# Patient Record
Sex: FEMALE | Race: BLACK OR AFRICAN AMERICAN | NOT HISPANIC OR LATINO | Employment: UNEMPLOYED | URBAN - METROPOLITAN AREA
[De-identification: names, ages, dates, MRNs, and addresses within clinical notes are randomized per-mention and may not be internally consistent; named-entity substitution may affect disease eponyms.]

---

## 2023-03-17 PROBLEM — E04.1 THYROID NODULE, COLD: Status: ACTIVE | Noted: 2023-03-17

## 2023-03-17 PROBLEM — R79.89 LOW TSH LEVEL: Status: ACTIVE | Noted: 2023-03-17

## 2023-03-17 PROBLEM — M54.2 CERVICALGIA: Status: ACTIVE | Noted: 2023-03-17

## 2023-03-17 PROBLEM — M54.12 CERVICAL RADICULOPATHY: Status: ACTIVE | Noted: 2023-03-17

## 2023-03-17 PROBLEM — S62.605A: Status: ACTIVE | Noted: 2023-03-17

## 2023-03-17 PROBLEM — N87.9 CERVICAL DYSPLASIA: Status: ACTIVE | Noted: 2023-03-17

## 2023-03-17 PROBLEM — N39.0 URINARY TRACT INFECTION: Status: ACTIVE | Noted: 2023-03-17

## 2023-03-17 PROBLEM — H60.91 OTITIS EXTERNA OF RIGHT EAR: Status: ACTIVE | Noted: 2023-03-17

## 2023-03-17 PROBLEM — H52.4 HYPEROPIA WITH PRESBYOPIA OF BOTH EYES: Status: ACTIVE | Noted: 2023-03-17

## 2023-03-17 PROBLEM — E55.9 VITAMIN D DEFICIENCY: Status: ACTIVE | Noted: 2023-03-17

## 2023-03-17 PROBLEM — E05.00 GRAVES' DISEASE: Status: ACTIVE | Noted: 2023-03-17

## 2023-03-17 PROBLEM — M79.672 LEFT FOOT PAIN: Status: ACTIVE | Noted: 2023-03-17

## 2023-03-17 PROBLEM — R09.82 POSTNASAL DRIP: Status: ACTIVE | Noted: 2023-03-17

## 2023-03-17 PROBLEM — L23.9 ALLERGIC DERMATITIS: Status: ACTIVE | Noted: 2023-03-17

## 2023-03-17 PROBLEM — R87.619 ABNORMAL PAP SMEAR OF CERVIX: Status: ACTIVE | Noted: 2023-03-17

## 2023-03-17 PROBLEM — E78.5 DYSLIPIDEMIA: Status: ACTIVE | Noted: 2023-03-17

## 2023-03-17 PROBLEM — H52.203 ASTIGMATISM OF BOTH EYES: Status: ACTIVE | Noted: 2023-03-17

## 2023-03-17 PROBLEM — R05.9 COUGH: Status: ACTIVE | Noted: 2023-03-17

## 2023-03-17 PROBLEM — H52.03 HYPEROPIA WITH PRESBYOPIA OF BOTH EYES: Status: ACTIVE | Noted: 2023-03-17

## 2023-03-17 PROBLEM — H25.13 CATARACT, NUCLEAR SCLEROTIC, BOTH EYES: Status: ACTIVE | Noted: 2023-03-17

## 2023-03-17 PROBLEM — E04.2 MULTINODULAR THYROID: Status: ACTIVE | Noted: 2023-03-17

## 2023-03-17 PROBLEM — J30.9 ALLERGIC RHINITIS: Status: ACTIVE | Noted: 2023-03-17

## 2023-03-17 PROBLEM — Q66.72 CAVUS DEFORMITY OF LEFT FOOT: Status: ACTIVE | Noted: 2023-03-17

## 2023-03-17 PROBLEM — K21.9 GERD (GASTROESOPHAGEAL REFLUX DISEASE): Status: ACTIVE | Noted: 2023-03-17

## 2023-03-17 PROBLEM — M19.079 1ST MTP ARTHRITIS: Status: ACTIVE | Noted: 2023-03-17

## 2023-03-17 PROBLEM — M20.12 HALLUX VALGUS WITH BUNIONS OF LEFT FOOT: Status: ACTIVE | Noted: 2023-03-17

## 2023-03-17 PROBLEM — S13.9XXA CERVICAL SPRAIN: Status: ACTIVE | Noted: 2023-03-17

## 2023-03-17 PROBLEM — S23.9XXA THORACIC SPRAIN: Status: ACTIVE | Noted: 2023-03-17

## 2023-03-17 PROBLEM — M21.612 HALLUX VALGUS WITH BUNIONS OF LEFT FOOT: Status: ACTIVE | Noted: 2023-03-17

## 2023-03-17 RX ORDER — ASPIRIN 325 MG
1 TABLET, DELAYED RELEASE (ENTERIC COATED) ORAL
COMMUNITY
Start: 2017-01-10

## 2023-03-17 RX ORDER — OMEPRAZOLE 20 MG/1
20 CAPSULE, DELAYED RELEASE ORAL DAILY
COMMUNITY
End: 2023-06-30

## 2023-03-17 RX ORDER — FLUTICASONE PROPIONATE 50 MCG
2 SPRAY, SUSPENSION (ML) NASAL DAILY
COMMUNITY
Start: 2015-03-30 | End: 2023-08-24

## 2023-03-17 RX ORDER — OFLOXACIN 3 MG/ML
10 SOLUTION AURICULAR (OTIC) 2 TIMES DAILY
COMMUNITY
End: 2023-03-22

## 2023-03-17 RX ORDER — AMMONIUM LACTATE 12 G/100G
1 LOTION TOPICAL 2 TIMES DAILY
COMMUNITY
Start: 2015-06-19

## 2023-03-22 ENCOUNTER — OFFICE VISIT (OUTPATIENT)
Dept: PRIMARY CARE | Facility: CLINIC | Age: 63
End: 2023-03-22
Payer: COMMERCIAL

## 2023-03-22 VITALS
DIASTOLIC BLOOD PRESSURE: 80 MMHG | HEART RATE: 75 BPM | SYSTOLIC BLOOD PRESSURE: 146 MMHG | HEIGHT: 63 IN | WEIGHT: 157 LBS | BODY MASS INDEX: 27.82 KG/M2

## 2023-03-22 DIAGNOSIS — J01.11 ACUTE RECURRENT FRONTAL SINUSITIS: Primary | ICD-10-CM

## 2023-03-22 PROCEDURE — 99214 OFFICE O/P EST MOD 30 MIN: CPT | Performed by: NURSE PRACTITIONER

## 2023-03-22 PROCEDURE — 1036F TOBACCO NON-USER: CPT | Performed by: NURSE PRACTITIONER

## 2023-03-22 RX ORDER — CIPROFLOXACIN HYDROCHLORIDE AND HYDROCORTISONE 2; 10 MG/ML; MG/ML
3 SUSPENSION AURICULAR (OTIC) 2 TIMES DAILY
Qty: 10 ML | Refills: 0 | Status: SHIPPED | OUTPATIENT
Start: 2023-03-22 | End: 2023-03-29

## 2023-03-22 RX ORDER — CEFUROXIME AXETIL 500 MG/1
500 TABLET ORAL 2 TIMES DAILY
Qty: 14 TABLET | Refills: 0 | Status: SHIPPED | OUTPATIENT
Start: 2023-03-22 | End: 2023-03-29

## 2023-03-22 NOTE — PROGRESS NOTES
"Subjective   Patient ID: Linda Rice is a 62 y.o. female who presents for Sinus Problem (Pt complains of a sinus infection ).    Linda is a patient of Dr. Breaux who presents to the office today with concerns of nasal congestion and sinus pressure for the past 2 weeks. She has had occasional cough. No fever, chills, body aches or diarrhea. Stated her ears feel congestion.         Review of Systems   All other systems reviewed and are negative.      Objective   /80   Pulse 75   Ht 1.6 m (5' 3\")   Wt 71.2 kg (157 lb)   BMI 27.81 kg/m²     Physical Exam  Constitutional:       Appearance: Normal appearance.   HENT:      Head: Normocephalic and atraumatic.      Right Ear: Tympanic membrane normal.      Ears:      Comments: Left TM with hazy cone of light.      Nose: Congestion (nasal mucosa with erythema and swelling.) present.      Mouth/Throat:      Mouth: Mucous membranes are moist.      Pharynx: Oropharynx is clear.   Eyes:      Extraocular Movements: Extraocular movements intact.      Conjunctiva/sclera: Conjunctivae normal.      Pupils: Pupils are equal, round, and reactive to light.   Cardiovascular:      Rate and Rhythm: Normal rate and regular rhythm.      Pulses: Normal pulses.      Heart sounds: Normal heart sounds.   Pulmonary:      Effort: Pulmonary effort is normal.      Breath sounds: Normal breath sounds.   Abdominal:      General: Abdomen is flat. Bowel sounds are normal.      Palpations: Abdomen is soft.   Musculoskeletal:         General: Normal range of motion.      Cervical back: Normal range of motion and neck supple.   Skin:     General: Skin is warm and dry.      Capillary Refill: Capillary refill takes less than 2 seconds.   Neurological:      General: No focal deficit present.      Mental Status: She is alert and oriented to person, place, and time. Mental status is at baseline.   Psychiatric:         Mood and Affect: Mood normal.         Behavior: Behavior normal.         " Thought Content: Thought content normal.         Judgment: Judgment normal.         Assessment/Plan   Problem List Items Addressed This Visit    None  Visit Diagnoses       Acute recurrent frontal sinusitis    -  Primary    Relevant Medications    cefuroxime (Ceftin) 500 mg tablet    ciprofloxacin-hydrocortisone (Cipro HC) otic suspension          Acute sinusitis: make sure you are staying well hydrated with fluids. Follow-up if no improvement of if symptoms worsen.

## 2023-05-11 ENCOUNTER — OFFICE VISIT (OUTPATIENT)
Dept: PRIMARY CARE | Facility: CLINIC | Age: 63
End: 2023-05-11
Payer: COMMERCIAL

## 2023-05-11 VITALS
DIASTOLIC BLOOD PRESSURE: 73 MMHG | SYSTOLIC BLOOD PRESSURE: 105 MMHG | BODY MASS INDEX: 27.29 KG/M2 | HEIGHT: 63 IN | WEIGHT: 154 LBS

## 2023-05-11 DIAGNOSIS — K21.9 GASTROESOPHAGEAL REFLUX DISEASE WITHOUT ESOPHAGITIS: ICD-10-CM

## 2023-05-11 DIAGNOSIS — J30.1 ALLERGIC RHINITIS DUE TO POLLEN, UNSPECIFIED SEASONALITY: ICD-10-CM

## 2023-05-11 DIAGNOSIS — J01.00 ACUTE NON-RECURRENT MAXILLARY SINUSITIS: Primary | ICD-10-CM

## 2023-05-11 PROCEDURE — 1036F TOBACCO NON-USER: CPT | Performed by: FAMILY MEDICINE

## 2023-05-11 PROCEDURE — 99214 OFFICE O/P EST MOD 30 MIN: CPT | Performed by: FAMILY MEDICINE

## 2023-05-11 RX ORDER — LEVOFLOXACIN 500 MG/1
500 TABLET, FILM COATED ORAL DAILY
Qty: 10 TABLET | Refills: 0 | Status: SHIPPED | OUTPATIENT
Start: 2023-05-11 | End: 2023-05-21

## 2023-05-13 PROBLEM — J01.00 ACUTE NON-RECURRENT MAXILLARY SINUSITIS: Status: ACTIVE | Noted: 2023-05-13

## 2023-05-13 NOTE — ASSESSMENT & PLAN NOTE
You have a sinus infection.  Take the antibiotic you were prescribed until it is gone.  Take Mucinex DM or Robitussin DM as needed for cough and congestion.  You can also try Flonase Allergy 2 sprays to each nostril once a day for congestion.  You can also try salt water nose spray for stuffy nose.  If you do not have high blood pressure or heart problems, you can try Sudafed or similar decongestants for congestion.  You can take Claritin or Zyrtec as needed for drainage.  Take over-the-counter acetaminophen or ibuprofen as needed for pain or fever.  You can try throat lozenges or salt water gargles for any sore throat.  Your symptoms may take 7-10 days to go away.  Drink lots of fluids. Get plenty of rest.  Contact us if you have worsening symptoms such as high fever (102F), shortness of breath, inability to keep liquids and solids down, or other worsening symptoms. Contact us if your symptoms fail to improve after 10 days.  If your symptoms get worse after the clinic is closed, you can call our office to reach the on-call doctor.

## 2023-05-13 NOTE — PROGRESS NOTES
Assessment and Plan:  Problem List Items Addressed This Visit       Allergic rhinitis    Current Assessment & Plan     C/w meds         GERD (gastroesophageal reflux disease)    Current Assessment & Plan     C/w PPI         Acute non-recurrent maxillary sinusitis - Primary    Current Assessment & Plan     You have a sinus infection.  Take the antibiotic you were prescribed until it is gone.  Take Mucinex DM or Robitussin DM as needed for cough and congestion.  You can also try Flonase Allergy 2 sprays to each nostril once a day for congestion.  You can also try salt water nose spray for stuffy nose.  If you do not have high blood pressure or heart problems, you can try Sudafed or similar decongestants for congestion.  You can take Claritin or Zyrtec as needed for drainage.  Take over-the-counter acetaminophen or ibuprofen as needed for pain or fever.  You can try throat lozenges or salt water gargles for any sore throat.  Your symptoms may take 7-10 days to go away.  Drink lots of fluids. Get plenty of rest.  Contact us if you have worsening symptoms such as high fever (102F), shortness of breath, inability to keep liquids and solids down, or other worsening symptoms. Contact us if your symptoms fail to improve after 10 days.  If your symptoms get worse after the clinic is closed, you can call our office to reach the on-call doctor.          Relevant Medications    levoFLOXacin (Levaquin) 500 mg tablet         HPI:  Acute sinusitis c/o headache fever facial pain cough ear pain cough green phlem. No ST .  For 5 days  Taking PPI  Using flonase and zyrtec      ROS   Constitutional:  o weight loss. Negative for chills and fever.   HENT: Negative.     Respiratory: Negative.     Cardiovascular: Negative.    Gastrointestinal: Negative.  Negative for nausea and vomiting.   Endocrine: Negative.    Genitourinary: Negative.    Musculoskeletal: Negative.    Skin: Negative.  Negative for rash.   Allergic/Immunologic: Negative.   "  Neurological: Negative.    Hematological: Negative.    Psychiatric/Behavioral: Negative.     All other systems reviewed and are negative.      Blood Pressure 105/73   Height 1.6 m (5' 3\")   Weight 69.9 kg (154 lb)   Body Mass Index 27.28 kg/m²   Body mass index is 27.28 kg/m².  Physical Exam    ENT:      Head: Normocephalic and atraumatic.      Right Ear: Tympanic membrane normal.      Left Ear: Tympanic membrane normal.      Nose: Nose normal.      Mouth/Throat:      Mouth: Mucous membranes are moist.   Eyes:      Pupils: Pupils are equal, round, and reactive to light.   Cardiovascular:      Rate and Rhythm: Normal rate and regular rhythm.      Pulses: Normal pulses.      Heart sounds: Normal heart sounds.   Pulmonary:      Effort: Pulmonary effort is normal.      Breath sounds: Normal breath sounds.   Abdominal:      General: Abdomen is flat. Bowel sounds are normal.      Palpations: Abdomen is soft.   Musculoskeletal:         General: Normal range of motion.      Cervical back: Normal range of motion and neck supple.   Skin:     General: Skin is warm and dry.      Capillary Refill: Capillary refill takes less than 2 seconds.   Neurological:      General: No focal deficit present.      Mental Status: She is alert and oriented to person, place, and time.   Psychiatric:         Mood and Affect: Mood normal.       Active Problem List  Patient Active Problem List   Diagnosis    1st MTP arthritis    Abnormal Pap smear of cervix    Allergic dermatitis    Allergic rhinitis    Astigmatism of both eyes    Cataract, nuclear sclerotic, both eyes    Cavus deformity of left foot    Cervical dysplasia    Cervical radiculopathy    Cervical sprain    Cervicalgia    Closed fracture of phalanx of left ring finger    Cough    Dyslipidemia    GERD (gastroesophageal reflux disease)    Graves' disease    Hallux valgus with bunions of left foot    Hyperopia with presbyopia of both eyes    Left foot pain    Low TSH level    " Multinodular thyroid    Otitis externa of right ear    Postnasal drip    Thoracic sprain    Thyroid nodule, cold    Urinary tract infection    Vitamin D deficiency    Acute non-recurrent maxillary sinusitis       Comprehensive Medical/Surgical/Social/Family History  No past medical history on file.  No past surgical history on file.  Social History     Social History Narrative    Not on file       Allergies and Medications  Amoxicillin-pot clavulanate and Pollen extracts  Current Outpatient Medications   Medication Instructions    ammonium lactate (Lac-Hydrin) 12 % lotion 1 Application, Topical, 2 times daily    cetirizine (ZYRTEC) 10 mg, oral, Daily, as directed    cholecalciferol (Vitamin D-3) 1,250 mcg (50,000 unit) capsule 1 capsule, oral, Weekly    diclofenac sodium 1 % kit 1 Application, Topical, 3 times daily    fluticasone (Flonase) 50 mcg/actuation nasal spray 2 sprays, Each Nostril, Daily    levoFLOXacin (LEVAQUIN) 500 mg, oral, Daily    omeprazole (PRILOSEC) 20 mg, oral, Daily

## 2023-06-30 DIAGNOSIS — K21.9 GASTRO-ESOPHAGEAL REFLUX DISEASE WITHOUT ESOPHAGITIS: ICD-10-CM

## 2023-06-30 RX ORDER — OMEPRAZOLE 20 MG/1
20 CAPSULE, DELAYED RELEASE ORAL DAILY
Qty: 90 CAPSULE | Refills: 1 | Status: SHIPPED | OUTPATIENT
Start: 2023-06-30 | End: 2023-12-22

## 2023-07-31 ENCOUNTER — OFFICE VISIT (OUTPATIENT)
Dept: PRIMARY CARE | Facility: CLINIC | Age: 63
End: 2023-07-31
Payer: COMMERCIAL

## 2023-07-31 VITALS
SYSTOLIC BLOOD PRESSURE: 143 MMHG | DIASTOLIC BLOOD PRESSURE: 63 MMHG | WEIGHT: 154 LBS | HEIGHT: 63 IN | BODY MASS INDEX: 27.29 KG/M2

## 2023-07-31 DIAGNOSIS — H25.13 CATARACT, NUCLEAR SCLEROTIC, BOTH EYES: ICD-10-CM

## 2023-07-31 DIAGNOSIS — J01.01 ACUTE RECURRENT MAXILLARY SINUSITIS: Primary | ICD-10-CM

## 2023-07-31 PROCEDURE — 1036F TOBACCO NON-USER: CPT | Performed by: FAMILY MEDICINE

## 2023-07-31 PROCEDURE — 99214 OFFICE O/P EST MOD 30 MIN: CPT | Performed by: FAMILY MEDICINE

## 2023-07-31 RX ORDER — CEFUROXIME AXETIL 500 MG/1
500 TABLET ORAL 2 TIMES DAILY
Qty: 14 TABLET | Refills: 0 | Status: SHIPPED | OUTPATIENT
Start: 2023-07-31 | End: 2023-08-07

## 2023-07-31 ASSESSMENT — PATIENT HEALTH QUESTIONNAIRE - PHQ9
2. FEELING DOWN, DEPRESSED OR HOPELESS: NOT AT ALL
SUM OF ALL RESPONSES TO PHQ9 QUESTIONS 1 AND 2: 0
1. LITTLE INTEREST OR PLEASURE IN DOING THINGS: NOT AT ALL

## 2023-08-05 PROBLEM — J01.01 ACUTE RECURRENT MAXILLARY SINUSITIS: Status: ACTIVE | Noted: 2023-08-05

## 2023-08-05 NOTE — PROGRESS NOTES
Subjective   Patient ID: Linda Rice is a 62 y.o. female who presents for Sinusitis and Earache.  Sinusitis  Associated symptoms include ear pain.   Earache       Patient presents today with complaint of.  Otherwise feels well. No other complaints or concerns.    The patient's relevant past medical, surgical, family and social history was reviewed in Epic.    Acute sinusitis c/o headache fever facial pain cough ear pain cough green phlem. No ST .    Review of Systems   HENT:  Positive for ear pain.      A complete review of systems was performed and all systems were normal except what is noted in the HPI.    Objective   Physical Exam  HENT:      Right Ear: Tympanic membrane normal.      Left Ear: Tympanic membrane normal.      Nose: Congestion present.   Cardiovascular:      Rate and Rhythm: Normal rate and regular rhythm.      Pulses: Normal pulses.      Heart sounds: Normal heart sounds.   Pulmonary:      Effort: Pulmonary effort is normal.      Breath sounds: Normal breath sounds.   Neurological:      Mental Status: She is alert.             Assessment/Plan   Problem List Items Addressed This Visit       Cataract, nuclear sclerotic, both eyes    Relevant Orders    Referral to Ophthalmology    Acute recurrent maxillary sinusitis - Primary    Relevant Medications    cefuroxime (Ceftin) 500 mg tablet        Patient understands and agrees with treatment plan.         Julius Heard MD

## 2023-08-24 DIAGNOSIS — J01.10 ACUTE FRONTAL SINUSITIS, UNSPECIFIED: ICD-10-CM

## 2023-08-24 RX ORDER — FLUTICASONE PROPIONATE 50 MCG
2 SPRAY, SUSPENSION (ML) NASAL DAILY
Qty: 16 ML | Refills: 2 | Status: SHIPPED | OUTPATIENT
Start: 2023-08-24 | End: 2023-11-27

## 2023-11-26 DIAGNOSIS — J01.10 ACUTE FRONTAL SINUSITIS, UNSPECIFIED: ICD-10-CM

## 2023-11-27 RX ORDER — FLUTICASONE PROPIONATE 50 MCG
2 SPRAY, SUSPENSION (ML) NASAL DAILY
Qty: 16 ML | Refills: 2 | Status: SHIPPED | OUTPATIENT
Start: 2023-11-27

## 2023-12-07 ENCOUNTER — OFFICE VISIT (OUTPATIENT)
Dept: PRIMARY CARE | Facility: CLINIC | Age: 63
End: 2023-12-07
Payer: COMMERCIAL

## 2023-12-07 VITALS
HEIGHT: 63 IN | HEART RATE: 81 BPM | WEIGHT: 154 LBS | DIASTOLIC BLOOD PRESSURE: 80 MMHG | BODY MASS INDEX: 27.29 KG/M2 | SYSTOLIC BLOOD PRESSURE: 126 MMHG

## 2023-12-07 DIAGNOSIS — Z87.891 FORMER SMOKER: ICD-10-CM

## 2023-12-07 DIAGNOSIS — J01.01 ACUTE RECURRENT MAXILLARY SINUSITIS: Primary | ICD-10-CM

## 2023-12-07 DIAGNOSIS — R09.89 UPPER RESPIRATORY SYMPTOM: ICD-10-CM

## 2023-12-07 DIAGNOSIS — Z12.2 ENCOUNTER FOR SCREENING FOR LUNG CANCER: ICD-10-CM

## 2023-12-07 DIAGNOSIS — Z12.31 VISIT FOR SCREENING MAMMOGRAM: ICD-10-CM

## 2023-12-07 DIAGNOSIS — F17.200 TOBACCO DEPENDENCE: ICD-10-CM

## 2023-12-07 DIAGNOSIS — R79.89 LOW TSH LEVEL: ICD-10-CM

## 2023-12-07 PROCEDURE — 1036F TOBACCO NON-USER: CPT | Performed by: FAMILY MEDICINE

## 2023-12-07 PROCEDURE — 99214 OFFICE O/P EST MOD 30 MIN: CPT | Performed by: FAMILY MEDICINE

## 2023-12-07 PROCEDURE — 87637 SARSCOV2&INF A&B&RSV AMP PRB: CPT

## 2023-12-07 RX ORDER — CEFUROXIME AXETIL 500 MG/1
500 TABLET ORAL 2 TIMES DAILY
Qty: 14 TABLET | Refills: 0 | Status: SHIPPED | OUTPATIENT
Start: 2023-12-07 | End: 2023-12-14

## 2023-12-07 RX ORDER — METHYLPREDNISOLONE 4 MG/1
TABLET ORAL
Qty: 21 TABLET | Refills: 0 | Status: SHIPPED | OUTPATIENT
Start: 2023-12-07 | End: 2023-12-14

## 2023-12-07 ASSESSMENT — ENCOUNTER SYMPTOMS
LOSS OF SENSATION IN FEET: 0
DEPRESSION: 0
OCCASIONAL FEELINGS OF UNSTEADINESS: 0

## 2023-12-07 ASSESSMENT — PATIENT HEALTH QUESTIONNAIRE - PHQ9
2. FEELING DOWN, DEPRESSED OR HOPELESS: NOT AT ALL
1. LITTLE INTEREST OR PLEASURE IN DOING THINGS: NOT AT ALL
SUM OF ALL RESPONSES TO PHQ9 QUESTIONS 1 AND 2: 0

## 2023-12-07 NOTE — PROGRESS NOTES
"Subjective   Patient ID: Linda Rice is a 63 y.o. female who presents for Sick Visit.      HPI  Acute sinusitis c/o headache fever facial pain cough ear pain cough green phlem. No ST .     Current Outpatient Medications on File Prior to Visit   Medication Sig Dispense Refill    ammonium lactate (Lac-Hydrin) 12 % lotion Apply 1 Application topically twice a day.      cetirizine (ZyrTEC) 10 mg tablet Take 1 tablet (10 mg) by mouth once daily. as directed 30 tablet 11    cholecalciferol (Vitamin D-3) 1,250 mcg (50,000 unit) capsule Take 1 capsule (50,000 Units) by mouth 1 (one) time per week.      diclofenac sodium 1 % kit Apply 1 Application topically in the morning and 1 Application in the evening and 1 Application before bedtime.      fluticasone (Flonase) 50 mcg/actuation nasal spray ADMINISTER 2 SPRAYS INTO EACH NOSTRIL ONCE DAILY. 16 mL 2    omeprazole (PriLOSEC) 20 mg DR capsule Take 1 capsule (20 mg) by mouth once daily. 90 capsule 1     No current facility-administered medications on file prior to visit.        Review of Systems   Constitutional:  Positive for chills, fatigue and fever. Negative for activity change.   HENT:  Positive for congestion, rhinorrhea, sinus pressure, sinus pain and sore throat. Negative for ear discharge.    Eyes:  Negative for discharge and redness.   Respiratory:  Positive for cough and wheezing. Negative for shortness of breath.    Gastrointestinal: Negative.    Genitourinary: Negative.    Musculoskeletal: Negative.    Skin: Negative.    Allergic/Immunologic: Negative.    Neurological: Negative.        Objective   Blood Pressure 126/80   Pulse 81   Height 1.6 m (5' 3\")   Weight 69.9 kg (154 lb)   Body Mass Index 27.28 kg/m²   BSA: 1.76 meters squared  Growth percentiles: Facility age limit for growth %diane is 20 years. Facility age limit for growth %diane is 20 years.   Office Visit on 12/07/2023   Component Date Value Ref Range Status    RSV PCR 12/07/2023 Not Detected  " Not Detected Final    Flu A Result 12/07/2023 Not Detected  Not Detected Final    Flu B Result 12/07/2023 Not Detected  Not Detected Final    Coronavirus 2019, PCR 12/07/2023 Not Detected  Not Detected Final      Physical Exam  Constitutional:       Appearance: Normal appearance.   HENT:      Nose: Congestion and rhinorrhea present.   Eyes:      Pupils: Pupils are equal, round, and reactive to light.   Cardiovascular:      Rate and Rhythm: Normal rate and regular rhythm.   Pulmonary:      Effort: Pulmonary effort is normal.      Breath sounds: Normal breath sounds.   Neurological:      Mental Status: She is alert.         Assessment/Plan   Problem List Items Addressed This Visit             ICD-10-CM    Low TSH level R79.89    Relevant Orders    US thyroid    Acute recurrent maxillary sinusitis - Primary J01.01    Upper respiratory symptom R09.89    Relevant Medications    methylPREDNISolone (Medrol Dospak) 4 mg tablets    cefuroxime (Ceftin) 500 mg tablet    Other Relevant Orders    RSV PCR (Completed)    Influenza A, and B PCR (Completed)    Sars-CoV-2 PCR, Symptomatic (Completed)    Encounter for screening for lung cancer Z12.2    Relevant Orders    CT lung screening low dose    Tobacco dependence F17.200    Relevant Orders    CT lung screening low dose    Former smoker Z87.891    Relevant Orders    CT lung screening low dose    Visit for screening mammogram Z12.31    Relevant Orders    BI mammo bilateral screening tomosynthesis

## 2023-12-08 LAB
FLUAV RNA RESP QL NAA+PROBE: NOT DETECTED
FLUBV RNA RESP QL NAA+PROBE: NOT DETECTED
RSV RNA RESP QL NAA+PROBE: NOT DETECTED
SARS-COV-2 RNA RESP QL NAA+PROBE: NOT DETECTED

## 2023-12-09 PROBLEM — R09.89 UPPER RESPIRATORY SYMPTOM: Status: ACTIVE | Noted: 2023-12-09

## 2023-12-09 PROBLEM — Z87.891 FORMER SMOKER: Status: ACTIVE | Noted: 2023-12-09

## 2023-12-09 PROBLEM — F17.200 TOBACCO DEPENDENCE: Status: ACTIVE | Noted: 2023-12-09

## 2023-12-09 PROBLEM — Z12.2 ENCOUNTER FOR SCREENING FOR LUNG CANCER: Status: ACTIVE | Noted: 2023-12-09

## 2023-12-09 PROBLEM — Z12.31 VISIT FOR SCREENING MAMMOGRAM: Status: ACTIVE | Noted: 2023-12-09

## 2023-12-09 ASSESSMENT — ENCOUNTER SYMPTOMS
SORE THROAT: 1
ACTIVITY CHANGE: 0
GASTROINTESTINAL NEGATIVE: 1
EYE DISCHARGE: 0
FATIGUE: 1
MUSCULOSKELETAL NEGATIVE: 1
SINUS PRESSURE: 1
NEUROLOGICAL NEGATIVE: 1
FEVER: 1
ALLERGIC/IMMUNOLOGIC NEGATIVE: 1
RHINORRHEA: 1
CHILLS: 1
SINUS PAIN: 1
EYE REDNESS: 0
COUGH: 1
WHEEZING: 1
SHORTNESS OF BREATH: 0

## 2023-12-21 ENCOUNTER — ANCILLARY PROCEDURE (OUTPATIENT)
Dept: RADIOLOGY | Facility: CLINIC | Age: 63
End: 2023-12-21
Payer: COMMERCIAL

## 2023-12-21 ENCOUNTER — OFFICE VISIT (OUTPATIENT)
Dept: PRIMARY CARE | Facility: CLINIC | Age: 63
End: 2023-12-21
Payer: COMMERCIAL

## 2023-12-21 VITALS
SYSTOLIC BLOOD PRESSURE: 137 MMHG | HEART RATE: 80 BPM | DIASTOLIC BLOOD PRESSURE: 83 MMHG | WEIGHT: 155 LBS | HEIGHT: 63 IN | BODY MASS INDEX: 27.46 KG/M2

## 2023-12-21 DIAGNOSIS — M54.31 SCIATICA, RIGHT SIDE: ICD-10-CM

## 2023-12-21 DIAGNOSIS — M54.31 SCIATICA, RIGHT SIDE: Primary | ICD-10-CM

## 2023-12-21 PROCEDURE — 72110 X-RAY EXAM L-2 SPINE 4/>VWS: CPT

## 2023-12-21 PROCEDURE — 99214 OFFICE O/P EST MOD 30 MIN: CPT | Performed by: FAMILY MEDICINE

## 2023-12-21 PROCEDURE — 72110 X-RAY EXAM L-2 SPINE 4/>VWS: CPT | Performed by: RADIOLOGY

## 2023-12-21 PROCEDURE — 1036F TOBACCO NON-USER: CPT | Performed by: FAMILY MEDICINE

## 2023-12-21 RX ORDER — PREDNISONE 20 MG/1
40 TABLET ORAL DAILY
Qty: 14 TABLET | Refills: 0 | Status: SHIPPED | OUTPATIENT
Start: 2023-12-21 | End: 2024-01-04 | Stop reason: ALTCHOICE

## 2023-12-21 ASSESSMENT — ENCOUNTER SYMPTOMS
LOSS OF SENSATION IN FEET: 0
DEPRESSION: 0
OCCASIONAL FEELINGS OF UNSTEADINESS: 0

## 2023-12-21 ASSESSMENT — PATIENT HEALTH QUESTIONNAIRE - PHQ9
SUM OF ALL RESPONSES TO PHQ9 QUESTIONS 1 AND 2: 0
1. LITTLE INTEREST OR PLEASURE IN DOING THINGS: NOT AT ALL
2. FEELING DOWN, DEPRESSED OR HOPELESS: NOT AT ALL

## 2023-12-22 DIAGNOSIS — K21.9 GASTRO-ESOPHAGEAL REFLUX DISEASE WITHOUT ESOPHAGITIS: ICD-10-CM

## 2023-12-22 RX ORDER — OMEPRAZOLE 20 MG/1
20 CAPSULE, DELAYED RELEASE ORAL DAILY
Qty: 90 CAPSULE | Refills: 1 | Status: SHIPPED | OUTPATIENT
Start: 2023-12-22

## 2023-12-29 ENCOUNTER — HOSPITAL ENCOUNTER (OUTPATIENT)
Dept: RADIOLOGY | Facility: HOSPITAL | Age: 63
Discharge: HOME | End: 2023-12-29
Payer: COMMERCIAL

## 2023-12-29 DIAGNOSIS — Z12.2 ENCOUNTER FOR SCREENING FOR LUNG CANCER: ICD-10-CM

## 2023-12-29 DIAGNOSIS — F17.200 TOBACCO DEPENDENCE: ICD-10-CM

## 2023-12-29 DIAGNOSIS — Z87.891 FORMER SMOKER: ICD-10-CM

## 2023-12-29 DIAGNOSIS — R79.89 LOW TSH LEVEL: ICD-10-CM

## 2023-12-29 PROCEDURE — 76536 US EXAM OF HEAD AND NECK: CPT

## 2023-12-29 PROCEDURE — 76536 US EXAM OF HEAD AND NECK: CPT | Performed by: RADIOLOGY

## 2023-12-29 PROCEDURE — 71271 CT THORAX LUNG CANCER SCR C-: CPT

## 2023-12-29 PROCEDURE — 71271 CT THORAX LUNG CANCER SCR C-: CPT | Performed by: RADIOLOGY

## 2024-01-01 PROBLEM — R09.82 POSTNASAL DRIP: Status: RESOLVED | Noted: 2023-03-17 | Resolved: 2024-01-01

## 2024-01-01 PROBLEM — J01.01 ACUTE RECURRENT MAXILLARY SINUSITIS: Status: RESOLVED | Noted: 2023-08-05 | Resolved: 2024-01-01

## 2024-01-01 PROBLEM — H60.91 OTITIS EXTERNA OF RIGHT EAR: Status: RESOLVED | Noted: 2023-03-17 | Resolved: 2024-01-01

## 2024-01-01 PROBLEM — M54.31 SCIATICA, RIGHT SIDE: Status: ACTIVE | Noted: 2024-01-01

## 2024-01-01 NOTE — PROGRESS NOTES
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      Patient Active Problem List   Diagnosis    1st MTP arthritis    Abnormal Pap smear of cervix    Allergic dermatitis    Allergic rhinitis    Astigmatism of both eyes    Cataract, nuclear sclerotic, both eyes    Cavus deformity of left foot    Cervical dysplasia    Cervical radiculopathy    Cervical sprain    Cervicalgia    Closed fracture of phalanx of left ring finger    Cough    Dyslipidemia    GERD (gastroesophageal reflux disease)    Graves' disease    Hallux valgus with bunions of left foot    Hyperopia with presbyopia of both eyes    Left foot pain    Low TSH level    Multinodular thyroid    Thoracic sprain    Thyroid nodule, cold    Urinary tract infection    Vitamin D deficiency    Acute non-recurrent maxillary sinusitis    Upper respiratory symptom    Encounter for screening for lung cancer    Tobacco dependence    Former smoker    Visit for screening mammogram    Sciatica, right side     Past Medical History:   Diagnosis Date    Headache      History reviewed. No pertinent surgical history.  Family History   Problem Relation Name Age of Onset    Hypertension Mother      Arthritis Mother      Pancreatic cancer Father       Social History     Tobacco Use    Smoking status: Never    Smokeless tobacco: Never   Substance Use Topics    Alcohol use: Yes     Comment: social drinker    Drug use: Never         ALLERGIES  Allergies   Allergen Reactions    Amoxicillin-Pot Clavulanate Unknown    Pollen Extracts Unknown         MEDICATIONS  Current Outpatient Medications   Medication Sig Dispense Refill    ammonium lactate (Lac-Hydrin) 12 % lotion Apply 1 Application topically twice  a day.      cetirizine (ZyrTEC) 10 mg tablet Take 1 tablet (10 mg) by mouth once daily. as directed 30 tablet 11    cholecalciferol (Vitamin D-3) 1,250 mcg (50,000 unit) capsule Take 1 capsule (50,000 Units) by mouth 1 (one) time per week.      diclofenac sodium 1 % kit Apply 1 Application topically in the morning and 1 Application in the evening and 1 Application before bedtime.      fluticasone (Flonase) 50 mcg/actuation nasal spray ADMINISTER 2 SPRAYS INTO EACH NOSTRIL ONCE DAILY. 16 mL 2    omeprazole (PriLOSEC) 20 mg DR capsule TAKE 1 CAPSULE BY MOUTH EVERY DAY 90 capsule 1     No current facility-administered medications for this visit.         PHYSICAL EXAM  Visit Vitals  Blood Pressure 137/83   Pulse 80     .FLOWAMB[11   .FLOWAMB[14   Body mass index is 27.46 kg/m².  Gen: Alert, NAD  HEENT:  PERRLA, EOMI, conjunctiva and sclera normal in appearance  Respiratory:  Lungs CTAB  Cardiovascular:  Heart RRR. No M/R/G  Neuro:  Gross motor and sensory intact  Skin:  No suspicious lesions present  Tenderness of the paraspinal muscles of the right side SLR positive right-sided    ASSESSMENT/PLAN  Problem List Items Addressed This Visit       Sciatica, right side - Primary    Current Assessment & Plan     Patient was started on prednisone         Relevant Orders    XR lumbar spine complete 4+ views (Completed)           Julius Heard MD

## 2024-01-02 DIAGNOSIS — M54.50 LUMBAR BACK PAIN: ICD-10-CM

## 2024-01-02 DIAGNOSIS — E04.1 THYROID NODULE, COLD: ICD-10-CM

## 2024-01-03 PROBLEM — Z20.822 SUSPECTED SEVERE ACUTE RESPIRATORY SYNDROME CORONAVIRUS 2 (SARS-COV-2) INFECTION: Status: RESOLVED | Noted: 2022-02-18 | Resolved: 2024-01-03

## 2024-01-03 PROBLEM — R10.9 ABDOMINAL PAIN: Status: RESOLVED | Noted: 2024-01-03 | Resolved: 2024-01-03

## 2024-01-03 PROBLEM — H65.00 ACUTE SEROUS OTITIS MEDIA: Status: RESOLVED | Noted: 2024-01-03 | Resolved: 2024-01-03

## 2024-01-03 PROBLEM — J30.1 ALLERGIC RHINITIS DUE TO POLLEN: Status: RESOLVED | Noted: 2024-01-03 | Resolved: 2024-01-03

## 2024-01-03 RX ORDER — IPRATROPIUM BROMIDE 21 UG/1
SPRAY, METERED NASAL
COMMUNITY
Start: 2015-09-30

## 2024-01-04 ENCOUNTER — OFFICE VISIT (OUTPATIENT)
Dept: PRIMARY CARE | Facility: CLINIC | Age: 64
End: 2024-01-04
Payer: COMMERCIAL

## 2024-01-04 VITALS
SYSTOLIC BLOOD PRESSURE: 133 MMHG | BODY MASS INDEX: 27.64 KG/M2 | DIASTOLIC BLOOD PRESSURE: 82 MMHG | WEIGHT: 156 LBS | HEIGHT: 63 IN | HEART RATE: 87 BPM

## 2024-01-04 DIAGNOSIS — E78.49 FAMILIAL HYPERLIPIDEMIA: ICD-10-CM

## 2024-01-04 DIAGNOSIS — R79.89 LOW TSH LEVEL: ICD-10-CM

## 2024-01-04 DIAGNOSIS — E78.5 DYSLIPIDEMIA: ICD-10-CM

## 2024-01-04 DIAGNOSIS — M54.31 SCIATICA, RIGHT SIDE: ICD-10-CM

## 2024-01-04 DIAGNOSIS — Z00.00 ROUTINE GENERAL MEDICAL EXAMINATION AT A HEALTH CARE FACILITY: Primary | ICD-10-CM

## 2024-01-04 PROBLEM — M54.2 NECK PAIN: Status: RESOLVED | Noted: 2023-03-17 | Resolved: 2024-01-04

## 2024-01-04 LAB
25(OH)D3 SERPL-MCNC: 59 NG/ML (ref 30–100)
ALBUMIN SERPL BCP-MCNC: 4.3 G/DL (ref 3.4–5)
ALP SERPL-CCNC: 54 U/L (ref 33–136)
ALT SERPL W P-5'-P-CCNC: 38 U/L (ref 7–45)
ANION GAP SERPL CALC-SCNC: 14 MMOL/L (ref 10–20)
AST SERPL W P-5'-P-CCNC: 25 U/L (ref 9–39)
BILIRUB SERPL-MCNC: 0.6 MG/DL (ref 0–1.2)
BUN SERPL-MCNC: 12 MG/DL (ref 6–23)
CALCIUM SERPL-MCNC: 9.5 MG/DL (ref 8.6–10.6)
CHLORIDE SERPL-SCNC: 104 MMOL/L (ref 98–107)
CHOLEST SERPL-MCNC: 228 MG/DL (ref 0–199)
CHOLESTEROL/HDL RATIO: 3
CO2 SERPL-SCNC: 26 MMOL/L (ref 21–32)
CREAT SERPL-MCNC: 0.85 MG/DL (ref 0.5–1.05)
ERYTHROCYTE [DISTWIDTH] IN BLOOD BY AUTOMATED COUNT: 14.3 % (ref 11.5–14.5)
GFR SERPL CREATININE-BSD FRML MDRD: 78 ML/MIN/1.73M*2
GLUCOSE SERPL-MCNC: 79 MG/DL (ref 74–99)
HCT VFR BLD AUTO: 38.5 % (ref 36–46)
HDLC SERPL-MCNC: 76.6 MG/DL
HGB BLD-MCNC: 12.1 G/DL (ref 12–16)
LDLC SERPL CALC-MCNC: 139 MG/DL
MCH RBC QN AUTO: 28.6 PG (ref 26–34)
MCHC RBC AUTO-ENTMCNC: 31.4 G/DL (ref 32–36)
MCV RBC AUTO: 91 FL (ref 80–100)
NON HDL CHOLESTEROL: 151 MG/DL (ref 0–149)
NRBC BLD-RTO: 0 /100 WBCS (ref 0–0)
PLATELET # BLD AUTO: 322 X10*3/UL (ref 150–450)
POTASSIUM SERPL-SCNC: 4.2 MMOL/L (ref 3.5–5.3)
PROT SERPL-MCNC: 7 G/DL (ref 6.4–8.2)
RBC # BLD AUTO: 4.23 X10*6/UL (ref 4–5.2)
SODIUM SERPL-SCNC: 140 MMOL/L (ref 136–145)
TRIGL SERPL-MCNC: 60 MG/DL (ref 0–149)
TSH SERPL-ACNC: 1.13 MIU/L (ref 0.44–3.98)
VIT B12 SERPL-MCNC: 1893 PG/ML (ref 211–911)
VLDL: 12 MG/DL (ref 0–40)
WBC # BLD AUTO: 7.6 X10*3/UL (ref 4.4–11.3)

## 2024-01-04 PROCEDURE — 80061 LIPID PANEL: CPT

## 2024-01-04 PROCEDURE — 99396 PREV VISIT EST AGE 40-64: CPT | Performed by: FAMILY MEDICINE

## 2024-01-04 PROCEDURE — 82306 VITAMIN D 25 HYDROXY: CPT

## 2024-01-04 PROCEDURE — 84443 ASSAY THYROID STIM HORMONE: CPT

## 2024-01-04 PROCEDURE — 85027 COMPLETE CBC AUTOMATED: CPT

## 2024-01-04 PROCEDURE — 80053 COMPREHEN METABOLIC PANEL: CPT

## 2024-01-04 PROCEDURE — 1036F TOBACCO NON-USER: CPT | Performed by: FAMILY MEDICINE

## 2024-01-04 PROCEDURE — 82607 VITAMIN B-12: CPT

## 2024-01-04 PROCEDURE — 36415 COLL VENOUS BLD VENIPUNCTURE: CPT

## 2024-01-04 PROCEDURE — 99214 OFFICE O/P EST MOD 30 MIN: CPT | Performed by: FAMILY MEDICINE

## 2024-01-04 RX ORDER — PREDNISONE 20 MG/1
40 TABLET ORAL DAILY
Qty: 14 TABLET | Refills: 0 | Status: SHIPPED | OUTPATIENT
Start: 2024-01-04 | End: 2024-01-11

## 2024-01-04 ASSESSMENT — ENCOUNTER SYMPTOMS
OCCASIONAL FEELINGS OF UNSTEADINESS: 0
LOSS OF SENSATION IN FEET: 0
DEPRESSION: 0

## 2024-01-06 PROBLEM — E78.49 FAMILIAL HYPERLIPIDEMIA: Status: ACTIVE | Noted: 2024-01-06

## 2024-01-06 PROBLEM — Z00.00 ROUTINE GENERAL MEDICAL EXAMINATION AT A HEALTH CARE FACILITY: Status: ACTIVE | Noted: 2024-01-06

## 2024-01-07 NOTE — PROGRESS NOTES
"  Subjective     Patient ID: Linda Rice is a 63 y.o. female who presents for Back Pain (Lumbar follow up).      Last Physical : 1 Years ago     Pt's PMH, PSH, SH, FH , meds and allergies was obtained / reviewed and updated .     Dental visits : Y  Vision issues : N  Hearing issues : N    Immunizations : Y    Diet :  could be better  Exercise:  Weight concerns :     Alcohol: as noted in SH  Tobacco: as noted in SH  Recreational drug use : None/ as noted in SH    Sexually active : Active   Contraception :   Menstrual problems:  Premenopausal/perimenopausal/ postmenopausal:    G:  Parity:  Full term:    Premature:   (s):   Living :  Ab induced:   Ab spontaneous :  Ectopic :   Multiple :    PAP smear :  Mammogram :  Colonoscopy:    Metabolic screening   - Lipid   - Glucose    Back and leg pain'  Thyroid nodule bigger.   Lung CT ok  Due for lalitha  UTD cooncospcy  ======================================================    Visit Vitals  Blood Pressure 133/82   Pulse 87   Height 1.6 m (5' 3\")   Weight 70.8 kg (156 lb)   Body Mass Index 27.63 kg/m²   Smoking Status Never   Body Surface Area 1.77 m²      No LMP recorded.     =====================================    Review of systems:  Constitutional: no chills, no fever and no night sweats.     Eyes: no blurred vision and no eyesight problems.     ENT: no hearing loss, no nasal congestion, no nasal discharge, no hoarseness and no sore throat.     Cardiovascular: no chest pain, no intermittent leg claudication, no lower extremity edema, no palpitations and no syncope.     Respiratory: no cough, no shortness of breath during exertion, no shortness of breath at rest and no wheezing.     Gastrointestinal: no abdominal pain, no blood in stools, no constipation, no diarrhea, no melena, no nausea, no rectal pain and no vomiting.     Genitourinary: no dysuria, no change in urinary frequency, no urinary hesitancy, no feelings of urinary urgency and no vaginal discharge. "     Integumentary: no new skin lesions and no rashes.     Neurological: no difficulty walking, no headache, no limb weakness, no numbness and no tingling.     Psychiatric: no anxiety, no depression, no anhedonia and no substance use disorders.     Endocrine: no recent weight gain and no recent weight loss.     Hematologic/Lymphatic: no tendency for easy bruising and no swollen glands.   ============================================================    Physical exam :    Constitutional: Alert and in no acute distress. Well developed, well nourished.     Eyes: Normal external exam. Pupils were equal in size, round, reactive to light (PERRL) with normal accommodation and extraocular movements intact (EOMI).     Ears, Nose, Mouth, and Throat: External inspection of ears and nose: Normal.  Otoscopic examination: Normal.      Neck: No neck mass was observed. Supple.     Cardiovascular: Heart rate and rhythm were normal, normal S1 and S2, no gallops, no murmurs and no pericardial rub    Pulmonary: No respiratory distress. Clear bilateral breath sounds.     Abdomen: Soft nontender; no abdominal mass palpated. No organomegaly.     Skin: Normal skin color and pigmentation, normal skin turgor, and no rash.     Neurologic: Deep tendon reflexes were 2+ and symmetric. Sensation: Normal.     Psychiatric: Judgment and insight: Intact. Mood and affect: Normal.    Lymphatic : Cervical/ axillary/ groin Lns Palpable/ non palpable            All other systems have been reviewed and are negative for complaint.      Assessment/Plan    Problem List Items Addressed This Visit             ICD-10-CM    Dyslipidemia E78.5    Relevant Orders    Vitamin D 25-Hydroxy,Total (for eval of Vitamin D levels) (Completed)    TSH with reflex to Free T4 if abnormal (Completed)    Lipid Panel (Completed)    Comprehensive Metabolic Panel (Completed)    CBC (Completed)    Vitamin B12 (Completed)    Low TSH level R79.89    Relevant Orders    Vitamin D  25-Hydroxy,Total (for eval of Vitamin D levels) (Completed)    TSH with reflex to Free T4 if abnormal (Completed)    Lipid Panel (Completed)    Comprehensive Metabolic Panel (Completed)    CBC (Completed)    Vitamin B12 (Completed)    Sciatica, right side M54.31    Relevant Medications    predniSONE (Deltasone) 20 mg tablet    Routine general medical examination at a [facility] care facility - Primary Z00.00    Relevant Orders    Vitamin D 25-Hydroxy,Total (for eval of Vitamin D levels) (Completed)    TSH with reflex to Free T4 if abnormal (Completed)    Lipid Panel (Completed)    Comprehensive Metabolic Panel (Completed)    CBC (Completed)    Vitamin B12 (Completed)    Familial hyperlipidemia E78.49

## 2024-01-22 ENCOUNTER — HOSPITAL ENCOUNTER (OUTPATIENT)
Dept: RADIOLOGY | Facility: CLINIC | Age: 64
Discharge: HOME | End: 2024-01-22
Payer: COMMERCIAL

## 2024-01-22 VITALS — BODY MASS INDEX: 27.64 KG/M2 | HEIGHT: 63 IN

## 2024-01-22 DIAGNOSIS — Z12.31 VISIT FOR SCREENING MAMMOGRAM: ICD-10-CM

## 2024-01-22 PROCEDURE — 77067 SCR MAMMO BI INCL CAD: CPT

## 2024-01-22 PROCEDURE — 77063 BREAST TOMOSYNTHESIS BI: CPT | Performed by: RADIOLOGY

## 2024-01-22 PROCEDURE — 77067 SCR MAMMO BI INCL CAD: CPT | Performed by: RADIOLOGY

## 2024-01-29 ENCOUNTER — EVALUATION (OUTPATIENT)
Dept: PHYSICAL THERAPY | Facility: CLINIC | Age: 64
End: 2024-01-29
Payer: COMMERCIAL

## 2024-01-29 DIAGNOSIS — M54.50 LUMBAR BACK PAIN: Primary | ICD-10-CM

## 2024-01-29 PROCEDURE — 97161 PT EVAL LOW COMPLEX 20 MIN: CPT | Mod: GP | Performed by: PHYSICAL THERAPIST

## 2024-01-29 ASSESSMENT — PATIENT HEALTH QUESTIONNAIRE - PHQ9
1. LITTLE INTEREST OR PLEASURE IN DOING THINGS: SEVERAL DAYS
SUM OF ALL RESPONSES TO PHQ9 QUESTIONS 1 AND 2: 1
2. FEELING DOWN, DEPRESSED OR HOPELESS: NOT AT ALL

## 2024-01-29 ASSESSMENT — ENCOUNTER SYMPTOMS
LOSS OF SENSATION IN FEET: 0
OCCASIONAL FEELINGS OF UNSTEADINESS: 0
DEPRESSION: 1

## 2024-01-29 ASSESSMENT — PAIN - FUNCTIONAL ASSESSMENT: PAIN_FUNCTIONAL_ASSESSMENT: 0-10

## 2024-01-29 NOTE — PROGRESS NOTES
Physical Therapy    Physical Therapy Evaluation    Patient Name: Linda Rice  MRN: [id number]  Today's Date: 1/29/2024  Visit #1  Time Calculation  Start Time: 1422  Stop Time: 1449  Time Calculation (min): 27 min      Current Problem  Problem List Items Addressed This Visit             ICD-10-CM    Lumbar back pain - Primary M54.50    Relevant Orders    Follow Up In Physical Therapy         SUBJECTIVE  Subjective   General  Reason for Referral: back pain  Referred By: Dr Heard  General Comment: needs auth  Payor: [employer] / Plan: [employer] / Product Type: *No Product type* /   Precautions  Precautions  Precautions Comment: none       Chief complaint: Pt reports symptoms began a few weeks before [place].  Pt reports she gets a cramp in her butt and then around into her groin.  It goes up to her neck but this was 6 weeks ago. Reports that her body felt crooked.     Reports she was working and twisting at work one day and the pain started after that.     Pain  Pain Assessment: 0-10    Pain ranges:  1-5/10  Increases with: doesn't know  Decreases with: medication  Prior level of function: Ind with ADLs  Current functional level:  Pain limits the patient's ability to walk  Home set up: no concerns  Work status: works as an ,      OBJECTIVE  Lower Extremity Strength:  Date: eval RIGHT LEFT   Hip Flexion WNL WNL   Hip Extension     Hip Abduction     Hip Adduction     Knee Extension     Knee Flexion     Ankle DF     Ankle PF     Ankle INV     Ankle EV       Lumbar ROM: Flex WNL, Extension 50% with sore ness    Joint mobility tender with SI mobs  Posture WNL  Gait mechanics: WNL  Palpation no tenderness  Neurological symptoms no tenderness  Special tests:SLR negative        Outcome Measures:  GIBSON given to pt but not filled out correctly.  Unable to score       ASSESSMENT  The pt presents with signs and symptoms consistent with the medical diagnosis of back pain.  She presents with symptoms consistent with an  SI joint sprain which has resolved.    The pt has impairments including soreness with lumbar extension and functional limitations which include pain with walking occasionally.  The pt will benefit from skilled physical therapy to reduce impairments in order to return to prior level of function.     The physical therapy prognosis is good for the patient to achieve their goals.   The pt tolerated therapy treatment today well with no adverse effects.  Barriers to therapy include:  none    PLAN  The pt will be seen 1 days a week for 2-3 weeks.        The pt has been educated about the risks and benefits of physical therapy including manual therapy treatments and gives consent for treatment.     The patient will benefit from physical therapy treatment to include: therapeutic exercises, therapeutic activities, neurological re-education, manual therapy, modalities, and a home exercise program.     The patient's potential to reach their therapy goals is excellent.     The evaluating therapist is prn and therefore the pt's POC may be re-assessed or discharged by another staff therapist at this location based on scheduling and availability.  Pt is aware and agrees.     TREATMENT:    Therapeutic ex:  Open book stretch x 10  Seated thoracic extensions x 2  **NV hip strengthening and core strength (Ta's)  Manual:  Lumbar and SI PA mobs, R and L LE distraction completed for assessment only.   Pt educated about manual and consent received. Given opportunity to stop if needed  Goals:  Active       PT Problem       no pain for 1 week with ambulation and work       Start:  01/29/24    Expected End:  03/14/24            lumbar extension ROM WNL and pain free       Start:  01/29/24    Expected End:  03/14/24            SI joint symmetrical and not painful with joint mobility       Start:  01/29/24    Expected End:  03/14/24            compliant with HEP       Start:  01/29/24    Expected End:  03/14/24

## 2024-02-01 ENCOUNTER — OFFICE VISIT (OUTPATIENT)
Dept: SURGERY | Facility: CLINIC | Age: 64
End: 2024-02-01
Payer: COMMERCIAL

## 2024-02-01 VITALS — SYSTOLIC BLOOD PRESSURE: 148 MMHG | DIASTOLIC BLOOD PRESSURE: 73 MMHG | HEART RATE: 80 BPM

## 2024-02-01 DIAGNOSIS — E04.1 THYROID NODULE, COLD: ICD-10-CM

## 2024-02-01 PROCEDURE — 99214 OFFICE O/P EST MOD 30 MIN: CPT | Performed by: SURGERY

## 2024-02-01 PROCEDURE — 1036F TOBACCO NON-USER: CPT | Performed by: SURGERY

## 2024-02-01 PROCEDURE — 99204 OFFICE O/P NEW MOD 45 MIN: CPT | Performed by: SURGERY

## 2024-02-01 NOTE — PROGRESS NOTES
Subjective   Patient ID: Linda Rice is a 63 y.o. female who presents for follow-up of multinodular thyroid gland.    HPI I saw Mrs. Rice back in surgery clinic.  I had initially seen her in December 2019 for incidentally discovered nodular thyroid disease.  That was seen on a pulmonary CT scan.  At that time she had an enlarged thyroid nodule on the left side.  I performed a biopsy of that which came back benign.  Based on that we did a follow-up thyroid ultrasound for her and I saw her June 2020.  At that time her nodule remained stable at 3.3 cm in size.  It was 3.6 cm at her original biopsy.  We had recommended some ongoing follow-up but I have not seen her since that time.    More recently she had an updated thyroid ultrasound which was ordered by her primary care physician.  That was done on December 29, 2023.  It shows in the right thyroid lobe a 1.1 cm nodule which is stable, TI-RADS 3.  Left lobe 3.2 cm nodule TI-RADS 4, this corresponds to the nodule that was previously biopsy-proven benign.  Nodule in the isthmus 1.4 cm TI-RADS 1.  Radiology had mentioned that the nodule in the left side would meet criteria for biopsy.  This nodule has already been biopsied.  As it is stable, it does not require an additional biopsy at this time.    She remains clinically and biochemically euthyroid.  She had an updated TSH level performed on January 4, 2024 which was normal at 1.13.  Her TSH looks better.  In the past she has had some episodes of subclinical hyperthyroidism.  This appears to be resolved.    She denies any changes in her neck.  No pain no pressure no difficulty breathing or swallowing no troubles with her voice.    She denies any significant changes in her past medical history since I saw her 3 years ago.      Review of Systems    Objective   Physical Exam  Vitals reviewed.   Constitutional:       Appearance: Normal appearance.   Eyes:      Comments: No proptosis   Neck:      Vascular: No carotid  bruit.      Comments: She has some palpable bilateral thyroid nodules.  Her trachea is midline.  Cardiovascular:      Rate and Rhythm: Normal rate and regular rhythm.   Pulmonary:      Effort: Pulmonary effort is normal. No respiratory distress.      Breath sounds: Normal breath sounds. No wheezing or rales.   Musculoskeletal:         General: Normal range of motion.   Lymphadenopathy:      Cervical: No cervical adenopathy.   Skin:     General: Skin is warm and dry.   Neurological:      General: No focal deficit present.      Mental Status: She is alert and oriented to person, place, and time.   Psychiatric:         Mood and Affect: Mood normal.         Behavior: Behavior normal.         US THYROID;  12/29/2023 8:39 am      INDICATION:  Signs/Symptoms:see dx.      COMPARISON:  07/03/2021, 06/19/2020, 11/29/2019, no other thyroid ultrasounds for  comparison      ACCESSION NUMBER(S):  [id number]      ORDERING CLINICIAN:  RUI ALFRED      TECHNIQUE:  Multiple ultrasonographic images of the thyroid gland and surrounding  tissues were obtained.      FINDINGS:  RIGHT LOBE:  The right lobe is normal in size measuring 4.4 x 1.6 x 1.3 cm.  NODULE #1:      Location: Inferior  Size: 1.1 x 1.2 x 0.8 cm  Composition: Mixed solid cystic 1 point  Echogenicity: Hypoechoic 2 points  Shape:  Wider than tall 0 points  Margin:  Smooth 0 points  Echogenic Foci: None 0 points  On 07/03/2021, this measured 0.8 x 0.5 x 0.5 cm  On 06/19/2020, this measured 0.7 x 0.7 x 0.4 cm  On 11/29/2019, this measured 0.7 x 0.6 x 0.4 cm      If present previously:  Significant change in size (>/= 20% diameter increase in at least  two dimensions and minimal increase of 2mm?): Yes, increased. Change  in features or ACR TI-RADS category: No change.      The total score of this nodule is 3 points, corresponding to a  TI-RADS category 3-mildly suspicious-fine-needle aspiration if  greater than or equal to 2.5 cm-follow if greater than or equal to  1.5  cm.      LEFT LOBE:  The left lobe is large in size measuring 5.4 x 1.9 x 1.8 cm.  NODULE #1:      Location: Mid  Size: 3.2 x 1.9 x 1.5 cm  Composition: Mixed solid cystic 1 point  Echogenicity: Heterogeneous but predominantly hypoechoic 2 points  Shape:  Wider than tall 0 points  Margin:  Lobular 2 point  Echogenic Foci: Echogenic foci 3 points  On 07/03/2021, this measured 3.4 x 2.1 x 2.0 cm  On 06/19/2020, this measured 3.0 x 1.9 x 1.5 cm  On 11/29/2019, this measured 2.9 x 1.8 x 1.7 cm      If present previously:  Significant change in size (>/= 20% diameter increase in at least  two dimensions and minimal increase of 2mm?): No significant change.  Change in features or ACR TI-RADS category: No change.      The total score of this nodule is 8 points, corresponding to a  TI-RADS category 5-highly suspicious-fine-needle aspiration if  greater than or equal to 1 cm-follow if greater than or equal to 0.5  cm      ISTHMUS:  The isthmus measures 0.5 cm, nonenlarged.      NODULE #1:      Location: Isthmus  Size: 1.4 x 1.3 x 1.2 cm  Composition: Spongiform 0 point  Echogenicity: Isoechoic 1 point  Shape:  Wider than tall 0 points  Margin:  Smooth 0 points  Echogenic Foci: Echogenic foci 3 points  On 07/03/2021, this measured 1.0 x 0.9 x 0.9 cm  On 06/19/2020, this measured 1.2 x 1.1 x 0.6 cm  On 11/29/2019, this measured 1.0 x 1.0 x 0.5 cm      If present previously:  Significant change in size (>/= 20% diameter increase in at least  two dimensions and minimal increase of 2mm?): Gas Change in features  or ACR TI-RADS category: No change.      The total score of this nodule is 4 points, corresponding to a  TI-RADS category 4-moderately suspicious-fine-needle aspiration if  greater than or equal to 1.5 cm-follow if greater than or equal to 1  cm CERVICAL LYMPH NODES:  There are no enlarged or morphologically abnormal cervical lymph  nodes.      NODULES: (Please note, assessment and description of nodules is per  TI-RADS  criteria. Up to 4 total nodules described, which includes  largest and/or most clinically significant based on morphology.) It  is noted that some spongiform and/or cystic nodules may not be  specifically described and are TR category 1 (benign).      IMPRESSION:  1. Enlarged left lobe thyroid  2. 1.1 cm left TIRADS 3 mildly suspicious nodule. This has increased  in size compared to 07/03/2021. According to TI-RADS criteria, no  follow-up is needed.  3. 1.4 cm TI-RADS 4 isthmic nodule for which follow-up is recommended  in 1 year  4. 3.2 cm left TIRADS 5 highly suspicious nodule. According to  TI-RADS criteria, fine-needle aspiration is recommended    Assessment/Plan    Mrs. Rice has a known history of multinodular thyroid gland dating back to 2019.  Back at that time I performed an ultrasound-guided biopsy of her dominant 3.6 cm left-sided thyroid nodule.  That came back benign.  Her last visit with me was in 2020.    More recently her PCP ordered an updated ultrasound.  It shows that the dominant nodule in the left thyroid lobe is stable to perhaps slightly smaller at 3.2 cm in size.  She has some other small nodules 1.1 and 1.4 cm.  Neither of these would meet criteria for biopsy.    She has no compressive symptoms in her neck.  She remains clinically and biochemically euthyroid.    Plan    1.  I told her I would recommend another follow-up ultrasound in about 2 years.  My office staff order that for her today.  She can see me back after its been completed.         Franicsco Wilcox MD 02/01/24 1:41 PM

## 2024-02-07 ENCOUNTER — APPOINTMENT (OUTPATIENT)
Dept: PHYSICAL THERAPY | Facility: CLINIC | Age: 64
End: 2024-02-07
Payer: COMMERCIAL

## 2024-02-08 ENCOUNTER — OFFICE VISIT (OUTPATIENT)
Dept: PRIMARY CARE | Facility: CLINIC | Age: 64
End: 2024-02-08
Payer: COMMERCIAL

## 2024-02-08 VITALS
WEIGHT: 156 LBS | BODY MASS INDEX: 27.64 KG/M2 | HEART RATE: 76 BPM | SYSTOLIC BLOOD PRESSURE: 167 MMHG | HEIGHT: 63 IN | DIASTOLIC BLOOD PRESSURE: 79 MMHG

## 2024-02-08 DIAGNOSIS — H65.02 NON-RECURRENT ACUTE SEROUS OTITIS MEDIA OF LEFT EAR: ICD-10-CM

## 2024-02-08 DIAGNOSIS — J01.01 ACUTE RECURRENT MAXILLARY SINUSITIS: ICD-10-CM

## 2024-02-08 DIAGNOSIS — U07.1 COVID-19: Primary | ICD-10-CM

## 2024-02-08 DIAGNOSIS — I10 PRIMARY HYPERTENSION: ICD-10-CM

## 2024-02-08 PROCEDURE — 1036F TOBACCO NON-USER: CPT | Performed by: FAMILY MEDICINE

## 2024-02-08 PROCEDURE — 3077F SYST BP >= 140 MM HG: CPT | Performed by: FAMILY MEDICINE

## 2024-02-08 PROCEDURE — 99214 OFFICE O/P EST MOD 30 MIN: CPT | Performed by: FAMILY MEDICINE

## 2024-02-08 PROCEDURE — 87636 SARSCOV2 & INF A&B AMP PRB: CPT

## 2024-02-08 PROCEDURE — 3078F DIAST BP <80 MM HG: CPT | Performed by: FAMILY MEDICINE

## 2024-02-08 RX ORDER — CIPROFLOXACIN AND DEXAMETHASONE 3; 1 MG/ML; MG/ML
4 SUSPENSION/ DROPS AURICULAR (OTIC) 2 TIMES DAILY
Qty: 7.5 ML | Refills: 0 | Status: SHIPPED | OUTPATIENT
Start: 2024-02-08 | End: 2024-02-15

## 2024-02-08 RX ORDER — AMLODIPINE BESYLATE 5 MG/1
5 TABLET ORAL DAILY
Qty: 30 TABLET | Refills: 5 | Status: SHIPPED | OUTPATIENT
Start: 2024-02-08 | End: 2024-08-06

## 2024-02-08 RX ORDER — BENZONATATE 200 MG/1
200 CAPSULE ORAL 3 TIMES DAILY PRN
Qty: 42 CAPSULE | Refills: 0 | Status: SHIPPED | OUTPATIENT
Start: 2024-02-08 | End: 2024-03-09

## 2024-02-08 RX ORDER — CEFUROXIME AXETIL 500 MG/1
500 TABLET ORAL 2 TIMES DAILY
Qty: 14 TABLET | Refills: 0 | Status: SHIPPED | OUTPATIENT
Start: 2024-02-08 | End: 2024-02-15

## 2024-02-09 DIAGNOSIS — U07.1 COVID-19: Primary | ICD-10-CM

## 2024-02-09 LAB
FLUAV RNA RESP QL NAA+PROBE: NOT DETECTED
FLUBV RNA RESP QL NAA+PROBE: NOT DETECTED
SARS-COV-2 RNA RESP QL NAA+PROBE: DETECTED

## 2024-02-09 RX ORDER — NIRMATRELVIR AND RITONAVIR 300-100 MG
3 KIT ORAL 2 TIMES DAILY
Qty: 30 TABLET | Refills: 0 | Status: SHIPPED | OUTPATIENT
Start: 2024-02-09 | End: 2024-02-14

## 2024-02-10 PROBLEM — U07.1 COVID-19: Status: ACTIVE | Noted: 2024-02-10

## 2024-02-10 PROBLEM — I10 PRIMARY HYPERTENSION: Status: ACTIVE | Noted: 2024-02-10

## 2024-02-10 PROBLEM — H65.02 NON-RECURRENT ACUTE SEROUS OTITIS MEDIA OF LEFT EAR: Status: ACTIVE | Noted: 2024-02-10

## 2024-02-10 ASSESSMENT — ENCOUNTER SYMPTOMS
SORE THROAT: 1
COUGH: 1

## 2024-02-10 NOTE — PROGRESS NOTES
Subjective   Patient ID: Linda Rice is a 63 y.o. female who presents for Sick Visit, Sinusitis, Generalized Body Aches, Sore Throat, Nasal Congestion, and Cough.  Sinusitis  Associated symptoms include coughing, ear pain and a sore throat.   Earache   Associated symptoms include coughing and a sore throat.   Sore Throat   Associated symptoms include coughing and ear pain.   Cough  Associated symptoms include ear pain and a sore throat.     Patient presents today with complaint of.  Otherwise feels well. No other complaints or concerns.    The patient's relevant past medical, surgical, family and social history was reviewed in Epic.    Acute sinusitis c/o headache fever facial pain cough ear pain cough green phlem. No ST .    Review of Systems   HENT:  Positive for ear pain and sore throat.    Respiratory:  Positive for cough.      A complete review of systems was performed and all systems were normal except what is noted in the HPI.    Objective   Physical Exam  HENT:      Right Ear: Tympanic membrane normal.      Left Ear: Tympanic membrane normal.      Nose: Congestion present.   Cardiovascular:      Rate and Rhythm: Normal rate and regular rhythm.      Pulses: Normal pulses.      Heart sounds: Normal heart sounds.   Pulmonary:      Effort: Pulmonary effort is normal.      Breath sounds: Normal breath sounds.   Neurological:      Mental Status: She is alert.             Assessment/Plan   Problem List Items Addressed This Visit       COVID-19 - Primary    Primary hypertension    Relevant Medications    amLODIPine (Norvasc) 5 mg tablet    Non-recurrent acute serous otitis media of left ear    Relevant Medications    ciprofloxacin-dexamethasone (CiproDEX) otic suspension    Other Relevant Orders    Sars-CoV-2 and Influenza A/B PCR (Completed)     Other Visit Diagnoses       Acute recurrent maxillary sinusitis        Relevant Medications    benzonatate (Tessalon) 200 mg capsule    ciprofloxacin-dexamethasone  (CiproDEX) otic suspension    cefuroxime (Ceftin) 500 mg tablet    Other Relevant Orders    Sars-CoV-2 and Influenza A/B PCR (Completed)             Patient understands and agrees with treatment plan.         Julius Heard MD Chief Complaint/HPI:        ROS otherwise negative aside from what was mentioned above in HPI.      Patient Active Problem List   Diagnosis    1st MTP arthritis    Abnormal Pap smear of cervix    Allergic dermatitis    Allergic rhinitis    Astigmatism of both eyes    Cataract, nuclear sclerotic, both eyes    Cavus deformity of left foot    Cervical dysplasia    Cervical radiculopathy    Cervical sprain    Cervicalgia    Closed fracture of phalanx of left ring finger    Cough    Dyslipidemia    GERD (gastroesophageal reflux disease)    Graves' disease    Hallux valgus with bunions of left foot    Hyperopia with presbyopia of both eyes    Left foot pain    Low TSH level    Multinodular thyroid    Thoracic sprain    Thyroid nodule, cold    Urinary tract infection    Vitamin D deficiency    Acute non-recurrent maxillary sinusitis    Upper respiratory symptom    Encounter for screening for lung cancer    Tobacco dependence    Former smoker    Visit for screening mammogram    Sciatica, right side    Routine general medical examination at a health care facility    Familial hyperlipidemia    Lumbar back pain    COVID-19    Primary hypertension    Non-recurrent acute serous otitis media of left ear     Past Medical History:   Diagnosis Date    Abdominal pain 01/03/2024    Comment on above: ABD PAIN    Acute serous otitis media 01/03/2024    Allergic rhinitis due to pollen 01/03/2024    Headache     Neck pain 03/17/2023    Comment on above: Added by Problem List Migration; 2013-3-25; Moved to [facility] Nov 25 2013 9:22PM;    Suspected severe acute respiratory syndrome coronavirus 2 (SARS-CoV-2) infection 02/18/2022     Past Surgical History:   Procedure Laterality Date    BREAST BIOPSY       Family  History   Problem Relation Name Age of Onset    Hypertension Mother      Arthritis Mother      Pancreatic cancer Father       Social History     Tobacco Use    Smoking status: Never    Smokeless tobacco: Never   Substance Use Topics    Alcohol use: Yes     Comment: social drinker    Drug use: Never         ALLERGIES  Allergies   Allergen Reactions    Amoxicillin-Pot Clavulanate Unknown    Pollen Extracts Unknown         MEDICATIONS  Current Outpatient Medications   Medication Sig Dispense Refill    amLODIPine (Norvasc) 5 mg tablet Take 1 tablet (5 mg) by mouth once daily. 30 tablet 5    ammonium lactate (Lac-Hydrin) 12 % lotion Apply 1 Application topically twice a day.      benzonatate (Tessalon) 200 mg capsule Take 1 capsule (200 mg) by mouth 3 times a day as needed for cough. Do not crush or chew. 42 capsule 0    cefuroxime (Ceftin) 500 mg tablet Take 1 tablet (500 mg) by mouth 2 times a day for 7 days. 14 tablet 0    cetirizine (ZyrTEC) 10 mg tablet Take 1 tablet (10 mg) by mouth once daily. as directed 30 tablet 11    cholecalciferol (Vitamin D-3) 1,250 mcg (50,000 unit) capsule Take 1 capsule (50,000 Units) by mouth 1 (one) time per week.      ciprofloxacin-dexamethasone (CiproDEX) otic suspension Administer 4 drops into the left ear 2 times a day for 7 days. 7.5 mL 0    diclofenac sodium 1 % kit Apply 1 Application topically in the morning and 1 Application in the evening and 1 Application before bedtime.      fluticasone (Flonase) 50 mcg/actuation nasal spray ADMINISTER 2 SPRAYS INTO EACH NOSTRIL ONCE DAILY. 16 mL 2    ipratropium (Atrovent) 21 mcg (0.03 %) nasal spray Administer into affected nostril(s).      nirmatrelvir-ritonavir (Paxlovid) 300 mg (150 mg x 2)-100 mg tablet therapy pack Take 3 tablets by mouth 2 times a day for 5 days. TAKE AS DIRECTED (300mg nirmatrelvir/100mg ritonavir) TWICE DAILY 30 tablet 0    omeprazole (PriLOSEC) 20 mg DR capsule TAKE 1 CAPSULE BY MOUTH EVERY DAY 90 capsule 1      No current facility-administered medications for this visit.         PHYSICAL EXAM  Visit Vitals  Blood Pressure 167/79   Pulse 76     .FLOWAMB[11   .FLOWAMB[14   Body mass index is 27.63 kg/m².  Gen: Alert, NAD  HEENT:  PERRLA, EOMI, conjunctiva and sclera normal in appearance  Respiratory:  Lungs CTAB  Cardiovascular:  Heart RRR. No M/R/G  Neuro:  Gross motor and sensory intact  Skin:  No suspicious lesions present    ASSESSMENT/PLAN  Problem List Items Addressed This Visit       COVID-19 - Primary    Primary hypertension    Relevant Medications    amLODIPine (Norvasc) 5 mg tablet    Non-recurrent acute serous otitis media of left ear    Relevant Medications    ciprofloxacin-dexamethasone (CiproDEX) otic suspension    Other Relevant Orders    Sars-CoV-2 and Influenza A/B PCR (Completed)     Other Visit Diagnoses       Acute recurrent maxillary sinusitis        Relevant Medications    benzonatate (Tessalon) 200 mg capsule    ciprofloxacin-dexamethasone (CiproDEX) otic suspension    cefuroxime (Ceftin) 500 mg tablet    Other Relevant Orders    Sars-CoV-2 and Influenza A/B PCR (Completed)                Julius Heard MD

## 2024-02-14 ENCOUNTER — APPOINTMENT (OUTPATIENT)
Dept: PHYSICAL THERAPY | Facility: CLINIC | Age: 64
End: 2024-02-14
Payer: COMMERCIAL

## 2024-03-15 ENCOUNTER — TREATMENT (OUTPATIENT)
Dept: PHYSICAL THERAPY | Facility: CLINIC | Age: 64
End: 2024-03-15
Payer: COMMERCIAL

## 2024-03-15 DIAGNOSIS — M54.50 LUMBAR BACK PAIN: ICD-10-CM

## 2024-03-15 PROCEDURE — 97110 THERAPEUTIC EXERCISES: CPT | Mod: GP,CQ

## 2024-03-15 ASSESSMENT — PAIN SCALES - GENERAL: PAINLEVEL_OUTOF10: 0 - NO PAIN

## 2024-03-15 ASSESSMENT — PAIN - FUNCTIONAL ASSESSMENT: PAIN_FUNCTIONAL_ASSESSMENT: 0-10

## 2024-03-15 NOTE — PROGRESS NOTES
Physical Therapy    Physical Therapy Treatment    Patient Name: Linda Rice  MRN: [id number]  Today's Date: 3/15/2024  Time Calculation  Start Time: 0145  Stop Time: 0230  Time Calculation (min): 45 min  PT Therapeutic Procedures Time Entry  Therapeutic Exercise Time Entry: 45  Visit 2     Assessment:  PT Assessment  Evaluation/Treatment Tolerance: Patient tolerated treatment well  No pain through out today's treatment session. Initial cueing with starting new exercises, able to maintain proper form through remaining reps. Addition of glute and abdominal strengthening today. Continues to respond well to and enjoy LE distraction and open book exercise. Finishes session today feeling well with no pain.     Plan:   Continue treatment per current POC  Current Problem  1. Lumbar back pain  Follow Up In Physical Therapy      General        Subjective    No pain currently. Will have intermittent glute max to lateral glute/thigh pain that reaches between a 3-4/10 pain level. Compliant with her home exercises at least twice a day. NO other updates at this time.     Pain  Pain Assessment  Pain Assessment: 0-10  Pain Score: 0 - No pain    Objective     Treatments:  Therapeutic Exercise  Therapeutic Exercise Performed: Yes  Therapeutic ex: x 35 min  Open book stretch x 10  LTR x 10 each side  Bridge x 20  Hip add/abduction TA holds with both x 20  Mini crunch x 15  Supine piriformis stretching 2 x 20 sec each (good stretching, tightness)  Supine hamstring stretching (strap) 2 x 20 sec each (good stretching, tightness)  Seated thoracic extensions x 2  B LE distraction 5 x 10 sec each leg    OP EDUCATION:     Goals:  Active       PT Problem       no pain for 1 week with ambulation and work       Start:  01/29/24    Expected End:  03/14/24            lumbar extension ROM WNL and pain free       Start:  01/29/24    Expected End:  03/14/24            SI joint symmetrical and not painful with joint mobility       Start:  01/29/24     Expected End:  03/14/24            compliant with HEP       Start:  01/29/24    Expected End:  03/14/24

## 2024-03-22 ENCOUNTER — TREATMENT (OUTPATIENT)
Dept: PHYSICAL THERAPY | Facility: CLINIC | Age: 64
End: 2024-03-22
Payer: COMMERCIAL

## 2024-03-22 DIAGNOSIS — M54.50 LUMBAR BACK PAIN: ICD-10-CM

## 2024-03-22 PROCEDURE — 97110 THERAPEUTIC EXERCISES: CPT | Mod: GP,CQ

## 2024-03-22 ASSESSMENT — PAIN - FUNCTIONAL ASSESSMENT: PAIN_FUNCTIONAL_ASSESSMENT: 0-10

## 2024-03-22 ASSESSMENT — PAIN SCALES - GENERAL: PAINLEVEL_OUTOF10: 0 - NO PAIN

## 2024-03-22 NOTE — PROGRESS NOTES
Physical Therapy    Physical Therapy Treatment    Patient Name: Linda Rice  MRN: [id number]  Today's Date: 3/22/2024  Time Calculation  Start Time: 0145  Stop Time: 0230  Time Calculation (min): 45 min  PT Therapeutic Procedures Time Entry  Therapeutic Exercise Time Entry: 45  Visit 3    Assessment:  PT Assessment  Evaluation/Treatment Tolerance: Patient tolerated treatment well  Good response to warming up on the recumbent bike and performing active stretches/mobility on the steps, felt the soreness and tightness in the right hip had dissipated afterwards. Cueing on piriformis stretching for form and proper pulling direction. Repeated exercises done well without painful increases. No back pain when leaving today, feeling better than when starting.     Plan:   Continue treatment per current POC  Current Problem  1. Lumbar back pain  Follow Up In Physical Therapy      General        Subjective    No lumbar pain today, reports tightness and soreness through the right hip/thigh since last visit, feels that the hip adduction exercises caused this soreness. Performed some of the HEP exercises daily. No other updates or concerns at the moment.     Pain  Pain Assessment  Pain Assessment: 0-10  Pain Score: 0 - No pain    Objective     Treatments:  Therapeutic Exercise  Therapeutic Exercise Performed: Yes  Therapeutic ex: x 35 min  Recumbent bike x 8 min for warm up  Step hamstring stretching 2 x 20 sec each side  Lunge stretch x 15 each leg  Open book stretch x 7 each side 3 sec pause  LTR x 10 each side  Bridge x 20  Mini crunch x 15  Supine piriformis stretching 2 x 20 sec each (good stretching, tightness)  Seated bosu ball trunk flexions fwd/r/l x 10 each  Chair sit to stand x 15    OP EDUCATION:     Goals:  Active       PT Problem       no pain for 1 week with ambulation and work       Start:  01/29/24    Expected End:  03/14/24            lumbar extension ROM WNL and pain free       Start:  01/29/24    Expected  End:  03/14/24            SI joint symmetrical and not painful with joint mobility       Start:  01/29/24    Expected End:  03/14/24            compliant with HEP       Start:  01/29/24    Expected End:  03/14/24

## 2024-03-29 ENCOUNTER — TREATMENT (OUTPATIENT)
Dept: PHYSICAL THERAPY | Facility: CLINIC | Age: 64
End: 2024-03-29
Payer: COMMERCIAL

## 2024-03-29 DIAGNOSIS — M54.50 LUMBAR BACK PAIN: Primary | ICD-10-CM

## 2024-03-29 PROCEDURE — 97110 THERAPEUTIC EXERCISES: CPT | Mod: GP,CQ

## 2024-03-29 ASSESSMENT — PAIN SCALES - GENERAL: PAINLEVEL_OUTOF10: 0 - NO PAIN

## 2024-03-29 ASSESSMENT — PAIN - FUNCTIONAL ASSESSMENT: PAIN_FUNCTIONAL_ASSESSMENT: 0-10

## 2024-03-29 NOTE — PROGRESS NOTES
Physical Therapy    Physical Therapy Treatment    Patient Name: Linda Rice  MRN: [id number]  Today's Date: 3/29/2024  Time Calculation  Start Time: 0145  Stop Time: 0230  Time Calculation (min): 45 min  PT Therapeutic Procedures Time Entry  Therapeutic Exercise Time Entry: 45  Visit 4    Assessment:  PT Assessment  Evaluation/Treatment Tolerance: Patient tolerated treatment well  Addition of leg lift with TA hold for increased core strengthening, SKTC stretching and ISO hip Abd during bridges, all received well without issue. Remaining exercises all done well and tolerated with no pains. Continues to feel good at the end of the session without increased symptoms.   -Patient complaint and independent with HEP, has had no pain over the past week while at work, lumbar extension pain free, no SI pains, goals met.   -Reviewed HEP with patient for continued self care as at this time the patient wishes to be discharged from physical therapy.    Plan:   Discharge  Current Problem  1. Lumbar back pain        General        Subjective    Doing very well today, no current pain; has had no pain since last visit. Compliant with HEP every other day. No painful symptoms this week while at work.     Pain  Pain Assessment  Pain Assessment: 0-10  Pain Score: 0 - No pain    Objective     Treatments:  Therapeutic Exercise  Therapeutic Exercise Performed: Yes  Therapeutic ex: x 35 min  Recumbent bike x 8 min for warm up  Step hamstring stretching 2 x 20 sec each side  Lunge stretch x 15 each leg  Open book stretch x 7 each side 3 sec pause  LTR x 10 each side  SKTC 2 x 20 sec  Bridge with ISO GTB hip abduction x 20  Mini crunch x 15  Small range leg raise with TA hold x 10 each leg  Supine piriformis stretching 2 x 20 sec each (good stretching, tightness)  Seated bosu ball trunk flexions fwd/r/l x 10 each  Chair sit to stand x 15    OP EDUCATION:     Goals:  Active       PT Problem       no pain for 1 week with ambulation and work        Start:  01/29/24    Expected End:  03/14/24            lumbar extension ROM WNL and pain free       Start:  01/29/24    Expected End:  03/14/24            SI joint symmetrical and not painful with joint mobility       Start:  01/29/24    Expected End:  03/14/24            compliant with HEP       Start:  01/29/24    Expected End:  03/14/24

## 2024-04-01 ENCOUNTER — DOCUMENTATION (OUTPATIENT)
Dept: PHYSICAL THERAPY | Facility: CLINIC | Age: 64
End: 2024-04-01
Payer: COMMERCIAL

## 2024-04-01 NOTE — PROGRESS NOTES
Physical Therapy    Discharge Summary    Name: Linda Rice  MRN: [id number]  : 1960  Date: 24    Discharge Summary: PT    Discharge Information: Date of discharge 2024, Date of last visit 2024, Date of evaluation 2024, Number of attended visits 4, Referred by Dr. Julius Heard, and Referred for back pain    Therapy Summary: Patient complaint and independent with HEP, has had no pain over the past week while at work, lumbar extension pain free, no SI pains.     Rehab Discharge Reason: Achieved all and/or the most significant goals(s)

## 2024-06-13 ENCOUNTER — OFFICE VISIT (OUTPATIENT)
Dept: PRIMARY CARE | Facility: CLINIC | Age: 64
End: 2024-06-13
Payer: COMMERCIAL

## 2024-06-13 VITALS
SYSTOLIC BLOOD PRESSURE: 126 MMHG | HEART RATE: 77 BPM | BODY MASS INDEX: 26.97 KG/M2 | RESPIRATION RATE: 15 BRPM | DIASTOLIC BLOOD PRESSURE: 64 MMHG | OXYGEN SATURATION: 99 % | WEIGHT: 152.2 LBS | HEIGHT: 63 IN

## 2024-06-13 DIAGNOSIS — E78.49 FAMILIAL HYPERLIPIDEMIA: Primary | ICD-10-CM

## 2024-06-13 DIAGNOSIS — H60.393 OTHER INFECTIVE ACUTE OTITIS EXTERNA OF BOTH EARS: ICD-10-CM

## 2024-06-13 DIAGNOSIS — T78.40XD ALLERGY, SUBSEQUENT ENCOUNTER: ICD-10-CM

## 2024-06-13 DIAGNOSIS — I10 PRIMARY HYPERTENSION: ICD-10-CM

## 2024-06-13 DIAGNOSIS — J01.10 ACUTE NON-RECURRENT FRONTAL SINUSITIS: ICD-10-CM

## 2024-06-13 DIAGNOSIS — G44.86 CERVICOGENIC HEADACHE: ICD-10-CM

## 2024-06-13 PROCEDURE — 3078F DIAST BP <80 MM HG: CPT | Performed by: FAMILY MEDICINE

## 2024-06-13 PROCEDURE — 99214 OFFICE O/P EST MOD 30 MIN: CPT | Performed by: FAMILY MEDICINE

## 2024-06-13 PROCEDURE — 3074F SYST BP LT 130 MM HG: CPT | Performed by: FAMILY MEDICINE

## 2024-06-13 RX ORDER — CETIRIZINE HYDROCHLORIDE 10 MG/1
10 TABLET ORAL DAILY
Qty: 30 TABLET | Refills: 11 | Status: SHIPPED | OUTPATIENT
Start: 2024-06-13 | End: 2025-06-13

## 2024-06-13 RX ORDER — CIPROFLOXACIN AND DEXAMETHASONE 3; 1 MG/ML; MG/ML
4 SUSPENSION/ DROPS AURICULAR (OTIC) 2 TIMES DAILY
Qty: 7.5 ML | Refills: 0 | Status: SHIPPED | OUTPATIENT
Start: 2024-06-13 | End: 2024-06-20

## 2024-06-13 RX ORDER — FLUTICASONE PROPIONATE 50 MCG
2 SPRAY, SUSPENSION (ML) NASAL DAILY
Qty: 16 ML | Refills: 2 | Status: SHIPPED | OUTPATIENT
Start: 2024-06-13

## 2024-06-13 RX ORDER — CEFUROXIME AXETIL 500 MG/1
500 TABLET ORAL 2 TIMES DAILY
Qty: 14 TABLET | Refills: 0 | Status: SHIPPED | OUTPATIENT
Start: 2024-06-13 | End: 2024-06-20

## 2024-06-13 RX ORDER — CYCLOBENZAPRINE HCL 10 MG
10 TABLET ORAL 3 TIMES DAILY PRN
Qty: 60 TABLET | Refills: 0 | Status: SHIPPED | OUTPATIENT
Start: 2024-06-13 | End: 2024-08-12

## 2024-06-13 RX ORDER — AMLODIPINE BESYLATE 5 MG/1
5 TABLET ORAL DAILY
Qty: 30 TABLET | Refills: 5 | Status: SHIPPED | OUTPATIENT
Start: 2024-06-13 | End: 2024-12-10

## 2024-06-13 ASSESSMENT — PATIENT HEALTH QUESTIONNAIRE - PHQ9
SUM OF ALL RESPONSES TO PHQ9 QUESTIONS 1 AND 2: 0
2. FEELING DOWN, DEPRESSED OR HOPELESS: NOT AT ALL
1. LITTLE INTEREST OR PLEASURE IN DOING THINGS: NOT AT ALL

## 2024-06-13 NOTE — PROGRESS NOTES
Subjective   Patient ID: Linda Rice is a 63 y.o. female who presents for Sinusitis (X few days-1 week states headaches, earache and facial pressure. Has been taking pseudafed and nasal spray ).      HPI  Cough congestion post nasal draing  Headache   GERd controlled  NO SOB  Right ear pain  Current Outpatient Medications on File Prior to Visit   Medication Sig Dispense Refill    ammonium lactate (Lac-Hydrin) 12 % lotion Apply 1 Application topically twice a day.      cholecalciferol (Vitamin D-3) 1,250 mcg (50,000 unit) capsule Take 1 capsule (50,000 Units) by mouth 1 (one) time per week.      diclofenac sodium 1 % kit Apply 1 Application topically in the morning and 1 Application in the evening and 1 Application before bedtime.      ipratropium (Atrovent) 21 mcg (0.03 %) nasal spray Administer into affected nostril(s).      omeprazole (PriLOSEC) 20 mg DR capsule TAKE 1 CAPSULE BY MOUTH EVERY DAY 90 capsule 1    [DISCONTINUED] amLODIPine (Norvasc) 5 mg tablet Take 1 tablet (5 mg) by mouth once daily. 30 tablet 5    [DISCONTINUED] fluticasone (Flonase) 50 mcg/actuation nasal spray ADMINISTER 2 SPRAYS INTO EACH NOSTRIL ONCE DAILY. 16 mL 2    [DISCONTINUED] cetirizine (ZyrTEC) 10 mg tablet Take 1 tablet (10 mg) by mouth once daily. as directed 30 tablet 11     No current facility-administered medications on file prior to visit.        Review of Systems   Constitutional:  Positive for fatigue. Negative for activity change, chills and fever.   HENT:  Positive for congestion, ear pain, rhinorrhea, sinus pressure, sinus pain, sneezing and sore throat. Negative for ear discharge.    Eyes:  Negative for discharge and redness.   Respiratory: Negative.  Negative for shortness of breath.    Cardiovascular: Negative.    Gastrointestinal: Negative.  Negative for nausea and vomiting.   Endocrine: Negative.    Genitourinary: Negative.    Musculoskeletal: Negative.    Skin: Negative.  Negative for rash.   Allergic/Immunologic:  "Negative.    Neurological:  Positive for headaches.   Hematological: Negative.    Psychiatric/Behavioral: Negative.     All other systems reviewed and are negative.      Objective   /64   Pulse 77   Resp 15   Ht 1.6 m (5' 3\")   Wt 69 kg (152 lb 3.2 oz)   SpO2 99%   BMI 26.96 kg/m²   BSA: 1.75 meters squared  Growth percentiles: Facility age limit for growth %diane is 20 years. Facility age limit for growth %diane is 20 years.   No visits with results within 1 Week(s) from this visit.   Latest known visit with results is:   Office Visit on 02/08/2024   Component Date Value Ref Range Status    Flu A Result 02/08/2024 Not Detected  Not Detected Final    Flu B Result 02/08/2024 Not Detected  Not Detected Final    Coronavirus 2019, PCR 02/08/2024 Detected (A)  Not Detected Final      Physical Exam  Constitutional:       General: She is not in acute distress.  HENT:      Nose: Congestion and rhinorrhea present.      Mouth/Throat:      Pharynx: Oropharyngeal exudate and posterior oropharyngeal erythema present.   Eyes:      Extraocular Movements: Extraocular movements intact.   Cardiovascular:      Rate and Rhythm: Normal rate and regular rhythm.   Pulmonary:      Breath sounds: Normal breath sounds.   Abdominal:      General: Bowel sounds are normal.   Musculoskeletal:         General: Normal range of motion.      Cervical back: No rigidity.   Skin:     Findings: No rash.   Neurological:      General: No focal deficit present.      Mental Status: She is alert.   Psychiatric:         Thought Content: Thought content normal.         Assessment/Plan   Problem List Items Addressed This Visit             ICD-10-CM    Familial hyperlipidemia - Primary E78.49    Relevant Orders    Comprehensive Metabolic Panel    Lipid Panel    Primary hypertension I10    Relevant Medications    amLODIPine (Norvasc) 5 mg tablet    Cervicogenic headache G44.86    Relevant Medications    cyclobenzaprine (Flexeril) 10 mg tablet    Other " Relevant Orders    Sedimentation Rate    C-reactive protein    Acute otitis externa of both ears H60.503    Relevant Medications    ciprofloxacin-dexamethasone (CiproDEX) otic suspension    Allergies T78.40XA    Relevant Medications    cetirizine (ZyrTEC) 10 mg tablet    Acute non-recurrent frontal sinusitis J01.10    Relevant Medications    fluticasone (Flonase) 50 mcg/actuation nasal spray    cefuroxime (Ceftin) 500 mg tablet

## 2024-06-16 PROBLEM — T78.40XA ALLERGIES: Status: ACTIVE | Noted: 2024-06-16

## 2024-06-16 PROBLEM — G44.86 CERVICOGENIC HEADACHE: Status: ACTIVE | Noted: 2024-06-16

## 2024-06-16 PROBLEM — H60.503 ACUTE OTITIS EXTERNA OF BOTH EARS: Status: ACTIVE | Noted: 2024-06-16

## 2024-06-16 PROBLEM — J01.10 ACUTE NON-RECURRENT FRONTAL SINUSITIS: Status: ACTIVE | Noted: 2024-06-16

## 2024-06-16 ASSESSMENT — ENCOUNTER SYMPTOMS
RHINORRHEA: 1
ACTIVITY CHANGE: 0
VOMITING: 0
CHILLS: 0
HEMATOLOGIC/LYMPHATIC NEGATIVE: 1
EYE REDNESS: 0
EYE DISCHARGE: 0
SINUS PAIN: 1
CARDIOVASCULAR NEGATIVE: 1
PSYCHIATRIC NEGATIVE: 1
HEADACHES: 1
NAUSEA: 0
MUSCULOSKELETAL NEGATIVE: 1
ALLERGIC/IMMUNOLOGIC NEGATIVE: 1
SORE THROAT: 1
RESPIRATORY NEGATIVE: 1
FATIGUE: 1
ENDOCRINE NEGATIVE: 1
FEVER: 0
SHORTNESS OF BREATH: 0
GASTROINTESTINAL NEGATIVE: 1
SINUS PRESSURE: 1

## 2024-06-19 ENCOUNTER — LAB (OUTPATIENT)
Dept: LAB | Facility: LAB | Age: 64
End: 2024-06-19
Payer: COMMERCIAL

## 2024-06-20 ENCOUNTER — LAB (OUTPATIENT)
Dept: LAB | Facility: LAB | Age: 64
End: 2024-06-20
Payer: COMMERCIAL

## 2024-06-20 DIAGNOSIS — E78.49 FAMILIAL HYPERLIPIDEMIA: ICD-10-CM

## 2024-06-20 DIAGNOSIS — G44.86 CERVICOGENIC HEADACHE: ICD-10-CM

## 2024-06-20 LAB — ERYTHROCYTE [SEDIMENTATION RATE] IN BLOOD BY WESTERGREN METHOD: 8 MM/H (ref 0–30)

## 2024-06-20 PROCEDURE — 36415 COLL VENOUS BLD VENIPUNCTURE: CPT

## 2024-06-20 PROCEDURE — 80053 COMPREHEN METABOLIC PANEL: CPT

## 2024-06-20 PROCEDURE — 80061 LIPID PANEL: CPT

## 2024-06-20 PROCEDURE — 86140 C-REACTIVE PROTEIN: CPT

## 2024-06-20 PROCEDURE — 85652 RBC SED RATE AUTOMATED: CPT

## 2024-06-21 LAB
ALBUMIN SERPL BCP-MCNC: 4.5 G/DL (ref 3.4–5)
ALP SERPL-CCNC: 56 U/L (ref 33–136)
ALT SERPL W P-5'-P-CCNC: 18 U/L (ref 7–45)
ANION GAP SERPL CALC-SCNC: 14 MMOL/L (ref 10–20)
AST SERPL W P-5'-P-CCNC: 20 U/L (ref 9–39)
BILIRUB SERPL-MCNC: 0.7 MG/DL (ref 0–1.2)
BUN SERPL-MCNC: 12 MG/DL (ref 6–23)
CALCIUM SERPL-MCNC: 9.8 MG/DL (ref 8.6–10.6)
CHLORIDE SERPL-SCNC: 103 MMOL/L (ref 98–107)
CHOLEST SERPL-MCNC: 198 MG/DL (ref 0–199)
CHOLESTEROL/HDL RATIO: 3
CO2 SERPL-SCNC: 26 MMOL/L (ref 21–32)
CREAT SERPL-MCNC: 0.81 MG/DL (ref 0.5–1.05)
CRP SERPL-MCNC: 0.23 MG/DL
EGFRCR SERPLBLD CKD-EPI 2021: 82 ML/MIN/1.73M*2
GLUCOSE SERPL-MCNC: 67 MG/DL (ref 74–99)
HDLC SERPL-MCNC: 65.3 MG/DL
LDLC SERPL CALC-MCNC: 122 MG/DL
NON HDL CHOLESTEROL: 133 MG/DL (ref 0–149)
POTASSIUM SERPL-SCNC: 4.3 MMOL/L (ref 3.5–5.3)
PROT SERPL-MCNC: 7.6 G/DL (ref 6.4–8.2)
SODIUM SERPL-SCNC: 139 MMOL/L (ref 136–145)
TRIGL SERPL-MCNC: 52 MG/DL (ref 0–149)
VLDL: 10 MG/DL (ref 0–40)

## 2024-06-26 DIAGNOSIS — J01.01 ACUTE RECURRENT MAXILLARY SINUSITIS: ICD-10-CM

## 2024-06-26 RX ORDER — BENZONATATE 200 MG/1
200 CAPSULE ORAL 3 TIMES DAILY PRN
Qty: 270 CAPSULE | Refills: 1 | Status: SHIPPED | OUTPATIENT
Start: 2024-06-26 | End: 2024-12-23

## 2024-07-09 DIAGNOSIS — G44.86 CERVICOGENIC HEADACHE: ICD-10-CM

## 2024-07-10 RX ORDER — CYCLOBENZAPRINE HCL 10 MG
10 TABLET ORAL 3 TIMES DAILY PRN
Qty: 90 TABLET | Refills: 0 | Status: SHIPPED | OUTPATIENT
Start: 2024-07-10

## 2024-08-26 DIAGNOSIS — K21.9 GASTRO-ESOPHAGEAL REFLUX DISEASE WITHOUT ESOPHAGITIS: ICD-10-CM

## 2024-08-26 RX ORDER — OMEPRAZOLE 20 MG/1
20 CAPSULE, DELAYED RELEASE ORAL DAILY
Qty: 90 CAPSULE | Refills: 1 | Status: SHIPPED | OUTPATIENT
Start: 2024-08-26

## 2024-09-12 ENCOUNTER — OFFICE VISIT (OUTPATIENT)
Dept: PRIMARY CARE | Facility: CLINIC | Age: 64
End: 2024-09-12
Payer: COMMERCIAL

## 2024-09-12 ENCOUNTER — TELEPHONE (OUTPATIENT)
Dept: PRIMARY CARE | Facility: CLINIC | Age: 64
End: 2024-09-12

## 2024-09-12 DIAGNOSIS — H65.02 NON-RECURRENT ACUTE SEROUS OTITIS MEDIA OF LEFT EAR: Primary | ICD-10-CM

## 2024-09-12 DIAGNOSIS — J01.00 ACUTE NON-RECURRENT MAXILLARY SINUSITIS: ICD-10-CM

## 2024-09-12 PROCEDURE — 99214 OFFICE O/P EST MOD 30 MIN: CPT | Performed by: FAMILY MEDICINE

## 2024-09-12 RX ORDER — CIPROFLOXACIN AND DEXAMETHASONE 3; 1 MG/ML; MG/ML
4 SUSPENSION/ DROPS AURICULAR (OTIC) 2 TIMES DAILY
Qty: 7.5 ML | Refills: 0 | Status: SHIPPED | OUTPATIENT
Start: 2024-09-12 | End: 2024-09-19

## 2024-09-12 RX ORDER — CEFUROXIME AXETIL 500 MG/1
500 TABLET ORAL 2 TIMES DAILY
Qty: 14 TABLET | Refills: 0 | Status: SHIPPED | OUTPATIENT
Start: 2024-09-12 | End: 2024-09-19

## 2024-09-12 RX ORDER — METHYLPREDNISOLONE 4 MG/1
TABLET ORAL
Qty: 21 TABLET | Refills: 0 | Status: SHIPPED | OUTPATIENT
Start: 2024-09-12 | End: 2024-09-19

## 2024-09-12 NOTE — TELEPHONE ENCOUNTER
Insurance denied ear drops that  was prescribed to the patient , she said the pharmacy said to try something else

## 2024-09-13 DIAGNOSIS — H65.02 NON-RECURRENT ACUTE SEROUS OTITIS MEDIA OF LEFT EAR: Primary | ICD-10-CM

## 2024-09-13 RX ORDER — NEOMYCIN SULFATE, POLYMYXIN B SULFATE, HYDROCORTISONE 3.5; 10000; 1 MG/ML; [USP'U]/ML; MG/ML
2 SOLUTION/ DROPS AURICULAR (OTIC) 4 TIMES DAILY
Qty: 10 ML | Refills: 0 | Status: SHIPPED | OUTPATIENT
Start: 2024-09-13 | End: 2024-09-20

## 2024-09-14 DIAGNOSIS — J01.10 ACUTE NON-RECURRENT FRONTAL SINUSITIS: ICD-10-CM

## 2024-09-14 RX ORDER — FLUTICASONE PROPIONATE 50 MCG
2 SPRAY, SUSPENSION (ML) NASAL DAILY
Qty: 48 ML | Refills: 1 | Status: SHIPPED | OUTPATIENT
Start: 2024-09-14

## 2024-09-16 NOTE — PROGRESS NOTES
Subjective   Patient ID: Linda Rice is a 64 y.o. female who presents for No chief complaint on file..  Sinusitis  Associated symptoms include ear pain.   Earache       Patient presents today with complaint of.  Otherwise feels well. No other complaints or concerns.    The patient's relevant past medical, surgical, family and social history was reviewed in Epic.    Acute sinusitis c/o headache fever facial pain cough ear pain cough green phlem. No ST .    Review of Systems   HENT:  Positive for ear pain.      A complete review of systems was performed and all systems were normal except what is noted in the HPI.    Objective   Physical Exam  HENT:      Right Ear: Tympanic membrane normal.      Left Ear: Tympanic membrane normal.      Nose: Congestion present.   Cardiovascular:      Rate and Rhythm: Normal rate and regular rhythm.      Pulses: Normal pulses.      Heart sounds: Normal heart sounds.   Pulmonary:      Effort: Pulmonary effort is normal.      Breath sounds: Normal breath sounds.   Neurological:      Mental Status: She is alert.             Assessment/Plan   Problem List Items Addressed This Visit       Acute non-recurrent maxillary sinusitis    Relevant Medications    methylPREDNISolone (Medrol Dospak) 4 mg tablets    cefuroxime (Ceftin) 500 mg tablet    ciprofloxacin-dexamethasone (CiproDEX) otic suspension    Non-recurrent acute serous otitis media of left ear - Primary    Relevant Medications    methylPREDNISolone (Medrol Dospak) 4 mg tablets    cefuroxime (Ceftin) 500 mg tablet    ciprofloxacin-dexamethasone (CiproDEX) otic suspension        Patient understands and agrees with treatment plan.         Julius Heard MD blank

## 2024-11-12 ENCOUNTER — LAB (OUTPATIENT)
Dept: LAB | Facility: LAB | Age: 64
End: 2024-11-12
Payer: COMMERCIAL

## 2024-11-12 ENCOUNTER — APPOINTMENT (OUTPATIENT)
Dept: PRIMARY CARE | Facility: CLINIC | Age: 64
End: 2024-11-12
Payer: COMMERCIAL

## 2024-11-12 VITALS
BODY MASS INDEX: 27.64 KG/M2 | HEIGHT: 63 IN | HEART RATE: 77 BPM | SYSTOLIC BLOOD PRESSURE: 122 MMHG | WEIGHT: 156 LBS | DIASTOLIC BLOOD PRESSURE: 75 MMHG

## 2024-11-12 DIAGNOSIS — Z13.820 SCREENING FOR OSTEOPOROSIS: ICD-10-CM

## 2024-11-12 DIAGNOSIS — Z00.00 ROUTINE GENERAL MEDICAL EXAMINATION AT A HEALTH CARE FACILITY: Primary | ICD-10-CM

## 2024-11-12 DIAGNOSIS — E78.49 FAMILIAL HYPERLIPIDEMIA: ICD-10-CM

## 2024-11-12 DIAGNOSIS — Z00.00 ROUTINE GENERAL MEDICAL EXAMINATION AT A HEALTH CARE FACILITY: ICD-10-CM

## 2024-11-12 DIAGNOSIS — E55.9 VITAMIN D DEFICIENCY: ICD-10-CM

## 2024-11-12 DIAGNOSIS — Z87.891 FORMER SMOKER: ICD-10-CM

## 2024-11-12 DIAGNOSIS — E53.8 B12 DEFICIENCY: ICD-10-CM

## 2024-11-12 DIAGNOSIS — R79.89 LOW TSH LEVEL: ICD-10-CM

## 2024-11-12 DIAGNOSIS — Z12.31 VISIT FOR SCREENING MAMMOGRAM: ICD-10-CM

## 2024-11-12 DIAGNOSIS — K21.9 GASTROESOPHAGEAL REFLUX DISEASE WITHOUT ESOPHAGITIS: ICD-10-CM

## 2024-11-12 DIAGNOSIS — I10 PRIMARY HYPERTENSION: ICD-10-CM

## 2024-11-12 PROCEDURE — 82306 VITAMIN D 25 HYDROXY: CPT

## 2024-11-12 PROCEDURE — 84443 ASSAY THYROID STIM HORMONE: CPT

## 2024-11-12 PROCEDURE — 3078F DIAST BP <80 MM HG: CPT | Performed by: FAMILY MEDICINE

## 2024-11-12 PROCEDURE — 36415 COLL VENOUS BLD VENIPUNCTURE: CPT

## 2024-11-12 PROCEDURE — 3008F BODY MASS INDEX DOCD: CPT | Performed by: FAMILY MEDICINE

## 2024-11-12 PROCEDURE — 80053 COMPREHEN METABOLIC PANEL: CPT

## 2024-11-12 PROCEDURE — 82607 VITAMIN B-12: CPT

## 2024-11-12 PROCEDURE — 85027 COMPLETE CBC AUTOMATED: CPT

## 2024-11-12 PROCEDURE — 83036 HEMOGLOBIN GLYCOSYLATED A1C: CPT

## 2024-11-12 PROCEDURE — 80061 LIPID PANEL: CPT

## 2024-11-12 PROCEDURE — 1036F TOBACCO NON-USER: CPT | Performed by: FAMILY MEDICINE

## 2024-11-12 PROCEDURE — 3074F SYST BP LT 130 MM HG: CPT | Performed by: FAMILY MEDICINE

## 2024-11-12 PROCEDURE — 99396 PREV VISIT EST AGE 40-64: CPT | Performed by: FAMILY MEDICINE

## 2024-11-12 ASSESSMENT — ENCOUNTER SYMPTOMS
LOSS OF SENSATION IN FEET: 0
OCCASIONAL FEELINGS OF UNSTEADINESS: 0
DEPRESSION: 0

## 2024-11-12 ASSESSMENT — PATIENT HEALTH QUESTIONNAIRE - PHQ9
1. LITTLE INTEREST OR PLEASURE IN DOING THINGS: NOT AT ALL
2. FEELING DOWN, DEPRESSED OR HOPELESS: NOT AT ALL
SUM OF ALL RESPONSES TO PHQ9 QUESTIONS 1 AND 2: 0

## 2024-11-13 LAB
25(OH)D3 SERPL-MCNC: 88 NG/ML (ref 30–100)
ALBUMIN SERPL BCP-MCNC: 4.1 G/DL (ref 3.4–5)
ALP SERPL-CCNC: 56 U/L (ref 33–136)
ALT SERPL W P-5'-P-CCNC: 21 U/L (ref 7–45)
ANION GAP SERPL CALC-SCNC: 11 MMOL/L (ref 10–20)
AST SERPL W P-5'-P-CCNC: 19 U/L (ref 9–39)
BILIRUB SERPL-MCNC: 0.6 MG/DL (ref 0–1.2)
BUN SERPL-MCNC: 11 MG/DL (ref 6–23)
CALCIUM SERPL-MCNC: 9.4 MG/DL (ref 8.6–10.6)
CHLORIDE SERPL-SCNC: 104 MMOL/L (ref 98–107)
CHOLEST SERPL-MCNC: 180 MG/DL (ref 0–199)
CHOLESTEROL/HDL RATIO: 2.9
CO2 SERPL-SCNC: 28 MMOL/L (ref 21–32)
CREAT SERPL-MCNC: 0.95 MG/DL (ref 0.5–1.05)
EGFRCR SERPLBLD CKD-EPI 2021: 67 ML/MIN/1.73M*2
ERYTHROCYTE [DISTWIDTH] IN BLOOD BY AUTOMATED COUNT: 13.7 % (ref 11.5–14.5)
EST. AVERAGE GLUCOSE BLD GHB EST-MCNC: 97 MG/DL
GLUCOSE SERPL-MCNC: 69 MG/DL (ref 74–99)
HBA1C MFR BLD: 5 %
HCT VFR BLD AUTO: 38.4 % (ref 36–46)
HDLC SERPL-MCNC: 63 MG/DL
HGB BLD-MCNC: 12.1 G/DL (ref 12–16)
LDLC SERPL CALC-MCNC: 107 MG/DL
MCH RBC QN AUTO: 28.3 PG (ref 26–34)
MCHC RBC AUTO-ENTMCNC: 31.5 G/DL (ref 32–36)
MCV RBC AUTO: 90 FL (ref 80–100)
NON HDL CHOLESTEROL: 117 MG/DL (ref 0–149)
NRBC BLD-RTO: 0 /100 WBCS (ref 0–0)
PLATELET # BLD AUTO: 354 X10*3/UL (ref 150–450)
POTASSIUM SERPL-SCNC: 4.3 MMOL/L (ref 3.5–5.3)
PROT SERPL-MCNC: 7.1 G/DL (ref 6.4–8.2)
RBC # BLD AUTO: 4.27 X10*6/UL (ref 4–5.2)
SODIUM SERPL-SCNC: 139 MMOL/L (ref 136–145)
TRIGL SERPL-MCNC: 52 MG/DL (ref 0–149)
TSH SERPL-ACNC: 0.87 MIU/L (ref 0.44–3.98)
VIT B12 SERPL-MCNC: >2000 PG/ML (ref 211–911)
VLDL: 10 MG/DL (ref 0–40)
WBC # BLD AUTO: 6.5 X10*3/UL (ref 4.4–11.3)

## 2024-11-17 PROBLEM — E53.8 B12 DEFICIENCY: Status: ACTIVE | Noted: 2024-11-17

## 2024-11-17 PROBLEM — Z13.820 SCREENING FOR OSTEOPOROSIS: Status: ACTIVE | Noted: 2024-11-17

## 2024-11-17 NOTE — PROGRESS NOTES
"  Subjective     Patient ID: Linda Rice is a 64 y.o. female who presents for cpe.      Last Physical : 1  Years ago     Pt's PMH, PSH, SH, FH , meds and allergies was obtained / reviewed and updated .     Dental visits : Y  Vision issues : N  Hearing issues : N    Immunizations : Y    Diet :  could be better  Exercise:  Weight concerns :     Alcohol: as noted in SH  Tobacco: as noted in SH  Recreational drug use : None/ as noted in SH    Premenopausal/perimenopausal/ postmenopausal:postmenopausal    G:  Parity:  Full term:    Premature:   (s):   Living :  Ab induced:   Ab spontaneous :  Ectopic :   Multiple :    PAP smear :  Mammogram :utd  Colonoscopy:    Metabolic screening   - Lipid   - Glucose  ======================================================    Visit Vitals  /75   Pulse 77   Ht 1.6 m (5' 3\")   Wt 70.8 kg (156 lb)   BMI 27.63 kg/m²   OB Status Postmenopausal   Smoking Status Former   BSA 1.77 m²      No LMP recorded. Patient is postmenopausal.     =====================================    Review of systems:  Constitutional: no chills, no fever and no night sweats.     Eyes: no blurred vision and no eyesight problems.     ENT: no hearing loss, no nasal congestion, no nasal discharge, no hoarseness and no sore throat.     Cardiovascular: no chest pain, no intermittent leg claudication, no lower extremity edema, no palpitations and no syncope.     Respiratory: no cough, no shortness of breath during exertion, no shortness of breath at rest and no wheezing.     Gastrointestinal: no abdominal pain, no blood in stools, no constipation, no diarrhea, no melena, no nausea, no rectal pain and no vomiting.     Genitourinary: no dysuria, no change in urinary frequency, no urinary hesitancy, no feelings of urinary urgency and no vaginal discharge.     Musculoskeletal: no arthralgias, no back pain and no myalgias.     Integumentary: no new skin lesions and no rashes.     Neurological: no difficulty " walking, no headache, no limb weakness, no numbness and no tingling.     Psychiatric: no anxiety, no depression, no anhedonia and no substance use disorders.     Endocrine: no recent weight gain and no recent weight loss.     Hematologic/Lymphatic: no tendency for easy bruising and no swollen glands.   ============================================================    Physical exam :    Constitutional: Alert and in no acute distress. Well developed, well nourished.     Eyes: Normal external exam. Pupils were equal in size, round, reactive to light (PERRL) with normal accommodation and extraocular movements intact (EOMI).     Ears, Nose, Mouth, and Throat: External inspection of ears and nose: Normal.  Otoscopic examination: Normal.      Neck: No neck mass was observed. Supple.     Cardiovascular: Heart rate and rhythm were normal, normal S1 and S2, no gallops, no murmurs and no pericardial rub    Pulmonary: No respiratory distress. Clear bilateral breath sounds.     Abdomen: Soft nontender; no abdominal mass palpated. No organomegaly.     Musculoskeletal: No joint swelling seen, normal movements of all extremities. Range of motion: Normal.  Muscle strength/tone: Normal.      Skin: Normal skin color and pigmentation, normal skin turgor, and no rash.     Neurologic: Deep tendon reflexes were 2+ and symmetric. Sensation: Normal.     Psychiatric: Judgment and insight: Intact. Mood and affect: Normal.    Lymphatic : Cervical/ axillary/ groin Lns Palpable/ non palpable            All other systems have been reviewed and are negative for complaint.      Assessment/Plan    Problem List Items Addressed This Visit             ICD-10-CM    GERD (gastroesophageal reflux disease) K21.9    Low TSH level R79.89    Relevant Orders    US thyroid    Vitamin D deficiency E55.9    Relevant Orders    Vitamin D 25-Hydroxy,Total (for eval of Vitamin D levels) (Completed)    Former smoker Z87.891    Relevant Orders    CT lung screening low  dose    Visit for screening mammogram Z12.31    Relevant Orders    BI mammo bilateral screening tomosynthesis    Routine general medical examination at a health care facility - Primary Z00.00    Relevant Orders    Lipid Panel (Completed)    CBC (Completed)    TSH with reflex to Free T4 if abnormal (Completed)    Hemoglobin A1C (Completed)    Comprehensive Metabolic Panel (Completed)    Familial hyperlipidemia E78.49    Primary hypertension I10    B12 deficiency E53.8    Relevant Orders    Vitamin B12 (Completed)    Screening for osteoporosis Z13.820    Relevant Orders    XR DEXA bone density

## 2024-11-25 ENCOUNTER — HOSPITAL ENCOUNTER (OUTPATIENT)
Dept: RADIOLOGY | Facility: HOSPITAL | Age: 64
Discharge: HOME | End: 2024-11-25
Payer: COMMERCIAL

## 2024-11-25 DIAGNOSIS — R79.89 LOW TSH LEVEL: ICD-10-CM

## 2024-11-25 PROCEDURE — 76536 US EXAM OF HEAD AND NECK: CPT | Performed by: RADIOLOGY

## 2024-11-25 PROCEDURE — 76536 US EXAM OF HEAD AND NECK: CPT

## 2024-12-04 DIAGNOSIS — E04.1 THYROID NODULE, COLD: Primary | ICD-10-CM

## 2024-12-05 ENCOUNTER — TELEPHONE (OUTPATIENT)
Dept: PRIMARY CARE | Facility: CLINIC | Age: 64
End: 2024-12-05
Payer: COMMERCIAL

## 2024-12-17 ENCOUNTER — OFFICE VISIT (OUTPATIENT)
Dept: PRIMARY CARE | Facility: CLINIC | Age: 64
End: 2024-12-17
Payer: COMMERCIAL

## 2024-12-17 VITALS
DIASTOLIC BLOOD PRESSURE: 77 MMHG | WEIGHT: 158 LBS | HEIGHT: 63 IN | HEART RATE: 78 BPM | SYSTOLIC BLOOD PRESSURE: 124 MMHG | BODY MASS INDEX: 28 KG/M2

## 2024-12-17 DIAGNOSIS — E78.49 FAMILIAL HYPERLIPIDEMIA: Primary | ICD-10-CM

## 2024-12-17 DIAGNOSIS — J01.10 ACUTE NON-RECURRENT FRONTAL SINUSITIS: ICD-10-CM

## 2024-12-17 DIAGNOSIS — I10 PRIMARY HYPERTENSION: ICD-10-CM

## 2024-12-17 PROCEDURE — 99214 OFFICE O/P EST MOD 30 MIN: CPT | Performed by: FAMILY MEDICINE

## 2024-12-17 PROCEDURE — 3008F BODY MASS INDEX DOCD: CPT | Performed by: FAMILY MEDICINE

## 2024-12-17 PROCEDURE — 3078F DIAST BP <80 MM HG: CPT | Performed by: FAMILY MEDICINE

## 2024-12-17 PROCEDURE — 3074F SYST BP LT 130 MM HG: CPT | Performed by: FAMILY MEDICINE

## 2024-12-17 RX ORDER — CEFUROXIME AXETIL 500 MG/1
500 TABLET ORAL 2 TIMES DAILY
Qty: 14 TABLET | Refills: 0 | Status: SHIPPED | OUTPATIENT
Start: 2024-12-17 | End: 2024-12-24

## 2024-12-17 RX ORDER — AMLODIPINE BESYLATE 5 MG/1
5 TABLET ORAL DAILY
Qty: 90 TABLET | Refills: 1 | Status: SHIPPED | OUTPATIENT
Start: 2024-12-17 | End: 2025-06-15

## 2024-12-17 RX ORDER — FLUTICASONE PROPIONATE 50 MCG
2 SPRAY, SUSPENSION (ML) NASAL DAILY
Qty: 48 ML | Refills: 1 | Status: SHIPPED | OUTPATIENT
Start: 2024-12-17

## 2024-12-17 ASSESSMENT — PATIENT HEALTH QUESTIONNAIRE - PHQ9
1. LITTLE INTEREST OR PLEASURE IN DOING THINGS: NOT AT ALL
SUM OF ALL RESPONSES TO PHQ9 QUESTIONS 1 AND 2: 0
2. FEELING DOWN, DEPRESSED OR HOPELESS: NOT AT ALL

## 2024-12-17 ASSESSMENT — ENCOUNTER SYMPTOMS
LOSS OF SENSATION IN FEET: 0
OCCASIONAL FEELINGS OF UNSTEADINESS: 0
DEPRESSION: 0

## 2024-12-22 ASSESSMENT — ENCOUNTER SYMPTOMS
SINUS PRESSURE: 1
CARDIOVASCULAR NEGATIVE: 1
NAUSEA: 0
ALLERGIC/IMMUNOLOGIC NEGATIVE: 1
VOMITING: 0
FEVER: 0
GASTROINTESTINAL NEGATIVE: 1
COUGH: 1
HEMATOLOGIC/LYMPHATIC NEGATIVE: 1
CHILLS: 0
ENDOCRINE NEGATIVE: 1
NEUROLOGICAL NEGATIVE: 1
PSYCHIATRIC NEGATIVE: 1
MUSCULOSKELETAL NEGATIVE: 1

## 2024-12-22 NOTE — ASSESSMENT & PLAN NOTE
What is High Blood Pressure? High blood pressure (also called hypertension) is when your blood moves through your arteries at a higher pressure than normal and that forces your heart to work harder to pump the blood.     What are the Complications of High Blood Pressure? When your blood pressure gets too high or stays high for a long time, it can cause health problems such as:    Kidney disease or kidney failure   Heart attack and heart failure   Stroke   Vision problems   Circulation problems, poor blood supply to the legs    How are the causes of High Blood Pressure? There are many different causes and your provider can help you find out what might be causing your pressure to be too high.     What are the Signs of High Blood Pressure? High blood pressure doesn’t usually have warning signs or symptoms, so many people don’t realize they have it and that is why regular monitoring is important.     Prevention and Treatment: Often high blood pressure can be controlled with lifestyle changes and when necessary, medications. Adopting heart healthy habits can help:      Maintain a Healthy Weight   Eat a heart healthy diet full of vegetables, fruits and whole grains that are high in fiber   Be Physically Active every day   Find heart healthy ways to reduce stress and increase relaxation    Don’t use tobacco products   Limit your intake of alcohol, caffeine and salt   Monitor your blood pressure regularly: ask your provider how often   Take your medications as prescribed, even when you’re feeling well

## 2024-12-22 NOTE — ASSESSMENT & PLAN NOTE
Lifestyle changes for elevated cholesterol levels include diet and exercise. For exercise, we recommend at least 150 mins of moderate intensity exercise in a week (enough that your heart rate picks up and you are sweating). For diet, the mediterranean diet has been shown to help. General recommendations beyond that include having less red meat more chicken/turkey/fish, less butter more olive oil, and less fried food more fiber and vegetables.

## 2024-12-22 NOTE — PROGRESS NOTES
Subjective   Patient ID: Linda Rice is a 64 y.o. female who presents for Sick Visit and Sinusitis.  Sinusitis  Associated symptoms include congestion, coughing, ear pain and sinus pressure. Pertinent negatives include no chills.   Earache   Associated symptoms include coughing. Pertinent negatives include no rash or vomiting.     Patient presents today with complaint of.  Otherwise feels well. No other complaints or concerns.    The patient's relevant past medical, surgical, family and social history was reviewed in Epic.    Acute sinusitis c/o headache fever facial pain cough ear pain cough green phlem. No ST .    Review of Systems   Constitutional:  Negative for chills and fever.   HENT:  Positive for congestion, ear pain and sinus pressure.    Respiratory:  Positive for cough.    Cardiovascular: Negative.    Gastrointestinal: Negative.  Negative for nausea and vomiting.   Endocrine: Negative.    Genitourinary: Negative.    Musculoskeletal: Negative.    Skin: Negative.  Negative for rash.   Allergic/Immunologic: Negative.    Neurological: Negative.    Hematological: Negative.    Psychiatric/Behavioral: Negative.     All other systems reviewed and are negative.    A complete review of systems was performed and all systems were normal except what is noted in the HPI.    Objective   Physical Exam  Constitutional:       Appearance: Normal appearance.   HENT:      Right Ear: Tympanic membrane normal.      Left Ear: Tympanic membrane normal.      Nose: Congestion present.   Cardiovascular:      Rate and Rhythm: Normal rate and regular rhythm.      Pulses: Normal pulses.      Heart sounds: Normal heart sounds.   Pulmonary:      Effort: Pulmonary effort is normal.      Breath sounds: Normal breath sounds.   Abdominal:      General: Bowel sounds are normal.   Neurological:      General: No focal deficit present.      Mental Status: She is alert.   Psychiatric:         Mood and Affect: Mood normal.              Assessment/Plan   Problem List Items Addressed This Visit       Familial hyperlipidemia - Primary     Lifestyle changes for elevated cholesterol levels include diet and exercise. For exercise, we recommend at least 150 mins of moderate intensity exercise in a week (enough that your heart rate picks up and you are sweating). For diet, the mediterranean diet has been shown to help. General recommendations beyond that include having less red meat more chicken/turkey/fish, less butter more olive oil, and less fried food more fiber and vegetables.           Primary hypertension     What is High Blood Pressure? High blood pressure (also called hypertension) is when your blood moves through your arteries at a higher pressure than normal and that forces your heart to work harder to pump the blood.     What are the Complications of High Blood Pressure? When your blood pressure gets too high or stays high for a long time, it can cause health problems such as:    Kidney disease or kidney failure   Heart attack and heart failure   Stroke   Vision problems   Circulation problems, poor blood supply to the legs    How are the causes of High Blood Pressure? There are many different causes and your provider can help you find out what might be causing your pressure to be too high.     What are the Signs of High Blood Pressure? High blood pressure doesn’t usually have warning signs or symptoms, so many people don’t realize they have it and that is why regular monitoring is important.     Prevention and Treatment: Often high blood pressure can be controlled with lifestyle changes and when necessary, medications. Adopting heart healthy habits can help:      Maintain a Healthy Weight   Eat a heart healthy diet full of vegetables, fruits and whole grains that are high in fiber   Be Physically Active every day   Find heart healthy ways to reduce stress and increase relaxation    Don’t use tobacco products   Limit your intake of  alcohol, caffeine and salt   Monitor your blood pressure regularly: ask your provider how often   Take your medications as prescribed, even when you’re feeling well              Relevant Medications    amLODIPine (Norvasc) 5 mg tablet    Acute non-recurrent frontal sinusitis    Relevant Medications    fluticasone (Flonase) 50 mcg/actuation nasal spray    cefuroxime (Ceftin) 500 mg tablet        Patient understands and agrees with treatment plan.         Julius Heard MD blank

## 2025-03-03 DIAGNOSIS — K21.9 GASTRO-ESOPHAGEAL REFLUX DISEASE WITHOUT ESOPHAGITIS: ICD-10-CM

## 2025-03-03 RX ORDER — OMEPRAZOLE 20 MG/1
20 CAPSULE, DELAYED RELEASE ORAL DAILY
Qty: 90 CAPSULE | Refills: 1 | Status: SHIPPED | OUTPATIENT
Start: 2025-03-03

## 2025-06-30 DIAGNOSIS — I10 PRIMARY HYPERTENSION: ICD-10-CM

## 2025-06-30 RX ORDER — AMLODIPINE BESYLATE 5 MG/1
5 TABLET ORAL DAILY
Qty: 90 TABLET | Refills: 1 | OUTPATIENT
Start: 2025-06-30

## 2025-07-13 ENCOUNTER — PATIENT MESSAGE (OUTPATIENT)
Dept: PRIMARY CARE | Facility: CLINIC | Age: 65
End: 2025-07-13
Payer: COMMERCIAL

## 2025-08-25 DIAGNOSIS — K21.9 GASTRO-ESOPHAGEAL REFLUX DISEASE WITHOUT ESOPHAGITIS: ICD-10-CM

## 2025-08-25 RX ORDER — OMEPRAZOLE 20 MG/1
20 CAPSULE, DELAYED RELEASE ORAL DAILY
Qty: 90 CAPSULE | Refills: 1 | Status: SHIPPED | OUTPATIENT
Start: 2025-08-25

## 2025-09-04 ENCOUNTER — APPOINTMENT (OUTPATIENT)
Dept: PRIMARY CARE | Facility: CLINIC | Age: 65
End: 2025-09-04
Payer: COMMERCIAL